# Patient Record
Sex: FEMALE | Race: BLACK OR AFRICAN AMERICAN | NOT HISPANIC OR LATINO | Employment: OTHER | URBAN - METROPOLITAN AREA
[De-identification: names, ages, dates, MRNs, and addresses within clinical notes are randomized per-mention and may not be internally consistent; named-entity substitution may affect disease eponyms.]

---

## 2023-02-22 LAB
ALANINE AMINOTRANSFERASE (SGPT) (U/L) IN SER/PLAS: 15 U/L (ref 7–45)
ALBUMIN (G/DL) IN SER/PLAS: 3.8 G/DL (ref 3.4–5)
ALKALINE PHOSPHATASE (U/L) IN SER/PLAS: 141 U/L (ref 33–136)
ANION GAP IN SER/PLAS: 21 MMOL/L (ref 10–20)
ASPARTATE AMINOTRANSFERASE (SGOT) (U/L) IN SER/PLAS: 16 U/L (ref 9–39)
BILIRUBIN TOTAL (MG/DL) IN SER/PLAS: 0.4 MG/DL (ref 0–1.2)
CALCIUM (MG/DL) IN SER/PLAS: 9.4 MG/DL (ref 8.6–10.6)
CARBON DIOXIDE, TOTAL (MMOL/L) IN SER/PLAS: 19 MMOL/L (ref 21–32)
CHLORIDE (MMOL/L) IN SER/PLAS: 102 MMOL/L (ref 98–107)
CHOLESTEROL (MG/DL) IN SER/PLAS: 189 MG/DL (ref 0–199)
CHOLESTEROL IN HDL (MG/DL) IN SER/PLAS: 37.5 MG/DL
CHOLESTEROL/HDL RATIO: 5
CREATININE (MG/DL) IN SER/PLAS: 4.22 MG/DL (ref 0.5–1.05)
ERYTHROCYTE DISTRIBUTION WIDTH (RATIO) BY AUTOMATED COUNT: 16.1 % (ref 11.5–14.5)
ERYTHROCYTE MEAN CORPUSCULAR HEMOGLOBIN CONCENTRATION (G/DL) BY AUTOMATED: 30.8 G/DL (ref 32–36)
ERYTHROCYTE MEAN CORPUSCULAR VOLUME (FL) BY AUTOMATED COUNT: 90 FL (ref 80–100)
ERYTHROCYTES (10*6/UL) IN BLOOD BY AUTOMATED COUNT: 2.95 X10E12/L (ref 4–5.2)
ESTIMATED AVERAGE GLUCOSE FOR HBA1C: 160 MG/DL
GFR FEMALE: 10 ML/MIN/1.73M2
GLUCOSE (MG/DL) IN SER/PLAS: 147 MG/DL (ref 74–99)
HEMATOCRIT (%) IN BLOOD BY AUTOMATED COUNT: 26.6 % (ref 36–46)
HEMOGLOBIN (G/DL) IN BLOOD: 8.2 G/DL (ref 12–16)
HEMOGLOBIN A1C/HEMOGLOBIN TOTAL IN BLOOD: 7.2 %
LDL: 114 MG/DL (ref 0–99)
LEUKOCYTES (10*3/UL) IN BLOOD BY AUTOMATED COUNT: 7.3 X10E9/L (ref 4.4–11.3)
NRBC (PER 100 WBCS) BY AUTOMATED COUNT: 0 /100 WBC (ref 0–0)
PLATELETS (10*3/UL) IN BLOOD AUTOMATED COUNT: 208 X10E9/L (ref 150–450)
POTASSIUM (MMOL/L) IN SER/PLAS: 3.9 MMOL/L (ref 3.5–5.3)
PROTEIN TOTAL: 8.3 G/DL (ref 6.4–8.2)
SODIUM (MMOL/L) IN SER/PLAS: 138 MMOL/L (ref 136–145)
THYROTROPIN (MIU/L) IN SER/PLAS BY DETECTION LIMIT <= 0.05 MIU/L: 1.9 MIU/L (ref 0.44–3.98)
TRIGLYCERIDE (MG/DL) IN SER/PLAS: 187 MG/DL (ref 0–149)
UREA NITROGEN (MG/DL) IN SER/PLAS: 67 MG/DL (ref 6–23)
VLDL: 37 MG/DL (ref 0–40)

## 2023-05-16 LAB
ALBUMIN (G/DL) IN SER/PLAS: 3.4 G/DL (ref 3.4–5)
ANION GAP IN SER/PLAS: 17 MMOL/L (ref 10–20)
CALCIUM (MG/DL) IN SER/PLAS: 9.7 MG/DL (ref 8.6–10.6)
CARBON DIOXIDE, TOTAL (MMOL/L) IN SER/PLAS: 23 MMOL/L (ref 21–32)
CHLORIDE (MMOL/L) IN SER/PLAS: 104 MMOL/L (ref 98–107)
CREATININE (MG/DL) IN SER/PLAS: 4.53 MG/DL (ref 0.5–1.05)
ERYTHROCYTE DISTRIBUTION WIDTH (RATIO) BY AUTOMATED COUNT: 16.4 % (ref 11.5–14.5)
ERYTHROCYTE MEAN CORPUSCULAR HEMOGLOBIN CONCENTRATION (G/DL) BY AUTOMATED: 29.7 G/DL (ref 32–36)
ERYTHROCYTE MEAN CORPUSCULAR VOLUME (FL) BY AUTOMATED COUNT: 91 FL (ref 80–100)
ERYTHROCYTES (10*6/UL) IN BLOOD BY AUTOMATED COUNT: 2.85 X10E12/L (ref 4–5.2)
FERRITIN (UG/LL) IN SER/PLAS: 125 UG/L (ref 8–150)
GFR FEMALE: 10 ML/MIN/1.73M2
GLUCOSE (MG/DL) IN SER/PLAS: 116 MG/DL (ref 74–99)
HEMATOCRIT (%) IN BLOOD BY AUTOMATED COUNT: 25.9 % (ref 36–46)
HEMOGLOBIN (G/DL) IN BLOOD: 7.7 G/DL (ref 12–16)
IRON (UG/DL) IN SER/PLAS: 38 UG/DL (ref 35–150)
IRON BINDING CAPACITY (UG/DL) IN SER/PLAS: 304 UG/DL (ref 240–445)
IRON SATURATION (%) IN SER/PLAS: 13 % (ref 25–45)
LEUKOCYTES (10*3/UL) IN BLOOD BY AUTOMATED COUNT: 5.4 X10E9/L (ref 4.4–11.3)
NRBC (PER 100 WBCS) BY AUTOMATED COUNT: 0 /100 WBC (ref 0–0)
PHOSPHATE (MG/DL) IN SER/PLAS: 4 MG/DL (ref 2.5–4.9)
PLATELETS (10*3/UL) IN BLOOD AUTOMATED COUNT: 209 X10E9/L (ref 150–450)
POTASSIUM (MMOL/L) IN SER/PLAS: 4.5 MMOL/L (ref 3.5–5.3)
SODIUM (MMOL/L) IN SER/PLAS: 139 MMOL/L (ref 136–145)
UREA NITROGEN (MG/DL) IN SER/PLAS: 73 MG/DL (ref 6–23)

## 2023-07-07 LAB
ALBUMIN (G/DL) IN SER/PLAS: 3.4 G/DL (ref 3.4–5)
ANION GAP IN SER/PLAS: 18 MMOL/L (ref 10–20)
CALCIDIOL (25 OH VITAMIN D3) (NG/ML) IN SER/PLAS: 82 NG/ML
CALCIUM (MG/DL) IN SER/PLAS: 9.2 MG/DL (ref 8.6–10.6)
CARBON DIOXIDE, TOTAL (MMOL/L) IN SER/PLAS: 20 MMOL/L (ref 21–32)
CHLORIDE (MMOL/L) IN SER/PLAS: 101 MMOL/L (ref 98–107)
CREATININE (MG/DL) IN SER/PLAS: 4.61 MG/DL (ref 0.5–1.05)
ERYTHROCYTE DISTRIBUTION WIDTH (RATIO) BY AUTOMATED COUNT: 16.7 % (ref 11.5–14.5)
ERYTHROCYTE MEAN CORPUSCULAR HEMOGLOBIN CONCENTRATION (G/DL) BY AUTOMATED: 30.3 G/DL (ref 32–36)
ERYTHROCYTE MEAN CORPUSCULAR VOLUME (FL) BY AUTOMATED COUNT: 90 FL (ref 80–100)
ERYTHROCYTES (10*6/UL) IN BLOOD BY AUTOMATED COUNT: 2.64 X10E12/L (ref 4–5.2)
GFR FEMALE: 9 ML/MIN/1.73M2
GLUCOSE (MG/DL) IN SER/PLAS: 340 MG/DL (ref 74–99)
HEMATOCRIT (%) IN BLOOD BY AUTOMATED COUNT: 23.8 % (ref 36–46)
HEMOGLOBIN (G/DL) IN BLOOD: 7.2 G/DL (ref 12–16)
LEUKOCYTES (10*3/UL) IN BLOOD BY AUTOMATED COUNT: 5.8 X10E9/L (ref 4.4–11.3)
NRBC (PER 100 WBCS) BY AUTOMATED COUNT: 0 /100 WBC (ref 0–0)
PARATHYRIN INTACT (PG/ML) IN SER/PLAS: 149.5 PG/ML (ref 18.5–88)
PHOSPHATE (MG/DL) IN SER/PLAS: 4.6 MG/DL (ref 2.5–4.9)
PLATELETS (10*3/UL) IN BLOOD AUTOMATED COUNT: 157 X10E9/L (ref 150–450)
POTASSIUM (MMOL/L) IN SER/PLAS: 4.2 MMOL/L (ref 3.5–5.3)
SODIUM (MMOL/L) IN SER/PLAS: 135 MMOL/L (ref 136–145)
URATE (MG/DL) IN SER/PLAS: 6.6 MG/DL (ref 2.3–6.7)
UREA NITROGEN (MG/DL) IN SER/PLAS: 70 MG/DL (ref 6–23)

## 2023-09-15 LAB
ALBUMIN (G/DL) IN SER/PLAS: 3.4 G/DL (ref 3.4–5)
ANION GAP IN SER/PLAS: 18 MMOL/L (ref 10–20)
CALCIUM (MG/DL) IN SER/PLAS: 8.8 MG/DL (ref 8.6–10.6)
CARBON DIOXIDE, TOTAL (MMOL/L) IN SER/PLAS: 19 MMOL/L (ref 21–32)
CHLORIDE (MMOL/L) IN SER/PLAS: 104 MMOL/L (ref 98–107)
CREATININE (MG/DL) IN SER/PLAS: 6.06 MG/DL (ref 0.5–1.05)
ERYTHROCYTE DISTRIBUTION WIDTH (RATIO) BY AUTOMATED COUNT: 16 % (ref 11.5–14.5)
ERYTHROCYTE MEAN CORPUSCULAR HEMOGLOBIN CONCENTRATION (G/DL) BY AUTOMATED: 30 G/DL (ref 32–36)
ERYTHROCYTE MEAN CORPUSCULAR VOLUME (FL) BY AUTOMATED COUNT: 89 FL (ref 80–100)
ERYTHROCYTES (10*6/UL) IN BLOOD BY AUTOMATED COUNT: 2.91 X10E12/L (ref 4–5.2)
GFR FEMALE: 7 ML/MIN/1.73M2
GLUCOSE (MG/DL) IN SER/PLAS: 162 MG/DL (ref 74–99)
HEMATOCRIT (%) IN BLOOD BY AUTOMATED COUNT: 26 % (ref 36–46)
HEMOGLOBIN (G/DL) IN BLOOD: 7.8 G/DL (ref 12–16)
LEUKOCYTES (10*3/UL) IN BLOOD BY AUTOMATED COUNT: 6.4 X10E9/L (ref 4.4–11.3)
NRBC (PER 100 WBCS) BY AUTOMATED COUNT: 0 /100 WBC (ref 0–0)
PHOSPHATE (MG/DL) IN SER/PLAS: 5.2 MG/DL (ref 2.5–4.9)
PLATELETS (10*3/UL) IN BLOOD AUTOMATED COUNT: 208 X10E9/L (ref 150–450)
POTASSIUM (MMOL/L) IN SER/PLAS: 4.2 MMOL/L (ref 3.5–5.3)
SODIUM (MMOL/L) IN SER/PLAS: 137 MMOL/L (ref 136–145)
UREA NITROGEN (MG/DL) IN SER/PLAS: 74 MG/DL (ref 6–23)

## 2023-10-08 DIAGNOSIS — D64.9 ANEMIA, UNSPECIFIED TYPE: Primary | ICD-10-CM

## 2023-10-08 RX ORDER — FAMOTIDINE 10 MG/ML
20 INJECTION INTRAVENOUS ONCE AS NEEDED
Status: CANCELLED | OUTPATIENT
Start: 2023-10-12

## 2023-10-08 RX ORDER — ALBUTEROL SULFATE 0.83 MG/ML
3 SOLUTION RESPIRATORY (INHALATION) AS NEEDED
Status: CANCELLED | OUTPATIENT
Start: 2023-10-12

## 2023-10-08 RX ORDER — METHYLPREDNISOLONE SODIUM SUCCINATE 40 MG/ML
40 INJECTION INTRAMUSCULAR; INTRAVENOUS AS NEEDED
Status: CANCELLED | OUTPATIENT
Start: 2023-10-12

## 2023-10-08 RX ORDER — EPINEPHRINE 0.3 MG/.3ML
0.3 INJECTION SUBCUTANEOUS EVERY 5 MIN PRN
Status: CANCELLED | OUTPATIENT
Start: 2023-10-12

## 2023-10-08 RX ORDER — DIPHENHYDRAMINE HYDROCHLORIDE 50 MG/ML
50 INJECTION INTRAMUSCULAR; INTRAVENOUS AS NEEDED
Status: CANCELLED | OUTPATIENT
Start: 2023-10-12

## 2023-10-12 ENCOUNTER — INFUSION (OUTPATIENT)
Dept: INFUSION THERAPY | Facility: CLINIC | Age: 78
End: 2023-10-12
Payer: MEDICARE

## 2023-10-12 VITALS
WEIGHT: 139.33 LBS | TEMPERATURE: 98.3 F | BODY MASS INDEX: 25.48 KG/M2 | DIASTOLIC BLOOD PRESSURE: 74 MMHG | OXYGEN SATURATION: 98 % | RESPIRATION RATE: 18 BRPM | HEART RATE: 66 BPM | SYSTOLIC BLOOD PRESSURE: 161 MMHG

## 2023-10-12 DIAGNOSIS — D64.9 ANEMIA, UNSPECIFIED TYPE: ICD-10-CM

## 2023-10-12 PROBLEM — M48.02 STENOSIS, CERVICAL SPINE: Status: ACTIVE | Noted: 2023-10-12

## 2023-10-12 PROBLEM — K11.20 PAROTITIS: Status: ACTIVE | Noted: 2023-10-12

## 2023-10-12 PROBLEM — D10.9: Status: ACTIVE | Noted: 2023-10-12

## 2023-10-12 PROBLEM — H91.93 BILATERAL HEARING LOSS: Status: ACTIVE | Noted: 2023-10-12

## 2023-10-12 PROBLEM — R05.3 CHRONIC COUGH: Status: ACTIVE | Noted: 2023-10-12

## 2023-10-12 PROCEDURE — 96372 THER/PROPH/DIAG INJ SC/IM: CPT | Performed by: NURSE PRACTITIONER

## 2023-10-12 RX ORDER — ALBUTEROL SULFATE 0.83 MG/ML
3 SOLUTION RESPIRATORY (INHALATION) AS NEEDED
Status: CANCELLED | OUTPATIENT
Start: 2023-10-19

## 2023-10-12 RX ORDER — PRAVASTATIN SODIUM 20 MG/1
20 TABLET ORAL
COMMUNITY

## 2023-10-12 RX ORDER — MUPIROCIN 20 MG/G
OINTMENT TOPICAL
COMMUNITY
Start: 2019-05-29 | End: 2024-03-07 | Stop reason: ALTCHOICE

## 2023-10-12 RX ORDER — DIPHENHYDRAMINE HYDROCHLORIDE 50 MG/ML
50 INJECTION INTRAMUSCULAR; INTRAVENOUS AS NEEDED
Status: CANCELLED | OUTPATIENT
Start: 2023-10-19

## 2023-10-12 RX ORDER — SODIUM BICARBONATE 650 MG/1
650 TABLET ORAL 2 TIMES DAILY
COMMUNITY
Start: 2022-11-02

## 2023-10-12 RX ORDER — CLONIDINE HYDROCHLORIDE 0.2 MG/1
1 TABLET ORAL DAILY
COMMUNITY
Start: 2021-01-29 | End: 2024-02-29 | Stop reason: SDUPTHER

## 2023-10-12 RX ORDER — GLIPIZIDE 5 MG/1
5 TABLET ORAL
COMMUNITY

## 2023-10-12 RX ORDER — ERGOCALCIFEROL 1.25 MG/1
1.25 CAPSULE ORAL
COMMUNITY
Start: 2014-01-09 | End: 2024-06-07 | Stop reason: WASHOUT

## 2023-10-12 RX ORDER — ALLOPURINOL 100 MG/1
1 TABLET ORAL DAILY
COMMUNITY
Start: 2022-11-02

## 2023-10-12 RX ORDER — LOSARTAN POTASSIUM 100 MG/1
100 TABLET ORAL
COMMUNITY

## 2023-10-12 RX ORDER — OMEPRAZOLE 20 MG/1
20 CAPSULE, DELAYED RELEASE ORAL
COMMUNITY

## 2023-10-12 RX ORDER — OXYCODONE AND ACETAMINOPHEN 5; 325 MG/1; MG/1
1 TABLET ORAL
COMMUNITY
Start: 2014-01-09 | End: 2024-03-07 | Stop reason: ALTCHOICE

## 2023-10-12 RX ORDER — METOPROLOL TARTRATE 25 MG/1
25 TABLET, FILM COATED ORAL
COMMUNITY
Start: 2014-01-09

## 2023-10-12 RX ORDER — METFORMIN HYDROCHLORIDE 1000 MG/1
1000 TABLET ORAL 2 TIMES DAILY
COMMUNITY
End: 2024-02-29 | Stop reason: WASHOUT

## 2023-10-12 RX ORDER — CYCLOBENZAPRINE HCL 10 MG
10 TABLET ORAL AS NEEDED
COMMUNITY
Start: 2014-01-09

## 2023-10-12 RX ORDER — AMLODIPINE BESYLATE 10 MG/1
10 TABLET ORAL
COMMUNITY

## 2023-10-12 RX ORDER — FAMOTIDINE 10 MG/ML
20 INJECTION INTRAVENOUS ONCE AS NEEDED
Status: CANCELLED | OUTPATIENT
Start: 2023-10-19

## 2023-10-12 RX ORDER — HYDRALAZINE HYDROCHLORIDE 100 MG/1
100 TABLET, FILM COATED ORAL 3 TIMES DAILY
COMMUNITY
Start: 2022-10-05

## 2023-10-12 RX ORDER — EPINEPHRINE 0.3 MG/.3ML
0.3 INJECTION SUBCUTANEOUS EVERY 5 MIN PRN
Status: CANCELLED | OUTPATIENT
Start: 2023-10-19

## 2023-10-12 RX ORDER — LIDOCAINE 50 MG/G
1 PATCH TOPICAL AS NEEDED
COMMUNITY
Start: 2022-11-02

## 2023-10-12 RX ORDER — ATORVASTATIN CALCIUM 10 MG/1
1 TABLET, FILM COATED ORAL DAILY
COMMUNITY
Start: 2022-11-02

## 2023-10-12 RX ORDER — FERROUS SULFATE 325(65) MG
1 TABLET ORAL DAILY
COMMUNITY
Start: 2022-10-05

## 2023-10-12 RX ORDER — NAPROXEN SODIUM 220 MG/1
1 TABLET, FILM COATED ORAL DAILY
COMMUNITY
Start: 2021-01-28

## 2023-10-12 ASSESSMENT — ENCOUNTER SYMPTOMS
PSYCHIATRIC NEGATIVE: 1
CARDIOVASCULAR NEGATIVE: 1
GASTROINTESTINAL NEGATIVE: 1
CONSTITUTIONAL NEGATIVE: 1
HEMATOLOGIC/LYMPHATIC NEGATIVE: 1
MUSCULOSKELETAL NEGATIVE: 1
EYES NEGATIVE: 1
RESPIRATORY NEGATIVE: 1
NEUROLOGICAL NEGATIVE: 1

## 2023-10-12 ASSESSMENT — PAIN SCALES - GENERAL: PAINLEVEL: 0-NO PAIN

## 2023-10-12 NOTE — PATIENT INSTRUCTIONS
Today you received: Procrit [id number] subcutaneous injection right upper arm    Hold Hgb >11   9- Hgb 7.8   Hgb every 4 weeks    Next appointment next week        For:   1. Anemia, unspecified type          Please read the  Medication Guide that was given to you and reviewed during todays visit.     (Tell all doctors including dentists that you are taking this medication)     Go to the emergency room or call 911 if:  -You have signs of allergic reaction:   o         Rash, hives, itching.   o         Swollen, blistered, peeling skin.   o         Swelling of face, lips, mouth, tongue or throat.   o         Tightness of chest, trouble breathing, swallowing or talking      Call your doctor:     - If injection site gets red, warm, swollen, itchy or leaks fluid or pus.     (Leave band aid on your injection site for at least 2 hours and keep area clean and dry  - If you get sick or have symptoms of infection or are not feeling well for any reason.    (Wash your hands often, stay away from people who are sick)  - If you have side effects from your medication that do not go away or are bothersome.     (Refer to the teaching your nurse gave you for side effects to call your doctor about)     Common side effects may include:  stuffy nose, headache, feeling tired, muscle aches, upset stomach  - Before receiving any vaccines, Call the [facility] at   if:  - You get sick, are on antibiotics, have had a recent vaccine, have surgery or dental work and your doctor wants your visit rescheduled.  - You need to cancel and reschedule your visit for any reason. Call at least 2 days before your visit if you need to cancel.   - Your insurance changes before your next visit.    (We will need to get approval from your new insurance. This can take up to two weeks.)     The [facility] is opened Monday thru Friday. We are closed on weekends and holidays.     Voice mail will take your call if the center  is closed. If you leave a message please allow 24 hours for a call back during weekdays. If you leave a message on a weekend/holiday, we will call you back the next business day.

## 2023-10-12 NOTE — PROGRESS NOTES
[facility]   infusion Clinic Note   Date: 2023   Name: Vida May  : 1945   MRN: [id number]         Reason for Visit: Injections (Weekly Procrit [id number] subcutaneous injection)       Visit Type: INJECTION     Ordered By:  Tucker Pepper DO   Accompanied by:Relative      Diagnosis: Anemia, unspecified type      Allergies:   Allergies as of 10/12/2023    (No Known Allergies)        Current Medications has a current medication list which includes the following prescription(s): allopurinol, amlodipine, aspirin, atorvastatin, clonidine, cyclobenzaprine, epoetin abhinav, ergocalciferol, ferrous sulfate, glipizide, hydralazine, lidocaine, losartan, metformin, metoprolol tartrate, mupirocin, omeprazole, oxycodone-acetaminophen, pravastatin, and sodium bicarbonate.          Vitals:  Vitals:    10/12/23 1307   BP: 161/74   Patient Position: Sitting   BP Cuff Size: Adult   Pulse: 66   Resp: 18   Temp: 36.8 °C (98.3 °F)   TempSrc: Temporal   SpO2: 98%   Weight: 63.2 kg (139 lb 5.3 oz)          Infusion Pre-procedure Checklist:   Allergies reviewed: yes   Medications reviewed: yes     Previous reaction to current treatment: No      Assess patient for the concerns below. Document provider notification as appropriate.  - Active or recent infection with/without current antibiotic use: no  - Recent or planned invasive dental work: no  - Recent or planned surgeries: no  - Recently received or plans to receive vaccinations: no  - Has treatment related toxicities: no  - Is pregnant:  n/a    - Does the patient meet criteria to treat? Yes    Provider notified? Not applicable      Pain:  0/10   Is the pain different from normal: n/a   Is the pain tolerable: n/a   Is your Doctor aware: n/a       Labs:  9- Hgb 7.8      Fall Risk Screening: Janette Fall Risk  History of Falling, Immediate or Within 3 Months: No  Ambulatory Aid: Walks without aid/bedrest/nurse assist  Intravenous  Therapy/Heparin Lock: No  Gait/Transferring: Normal/bedrest/immobile  Mental Status: Oriented to own ability       Review of Systems   Constitutional: Negative.    HENT:  Negative.     Eyes: Negative.         Wears glasses   Respiratory: Negative.          Can become SOB on exertion   Cardiovascular: Negative.    Gastrointestinal: Negative.    Genitourinary: Negative.     Musculoskeletal: Negative.    Skin: Negative.    Neurological: Negative.    Hematological: Negative.    Psychiatric/Behavioral: Negative.           Infusion Readiness:   Assessment Concerns Related to Infusion: No (injection)  Provider notified: n/a      Document Below Only If Indicated:   New Patient Education:         Drug Specific Questions:  Epoetin Celestino (EPOGEN. PROCRIT, RETACRIT)  IS SBP > 160 OR 30 MM/HG GREATER THAN LAST RECORDED SBP? No  (PT SHOULD UNDERGO REGULAR BP MONITORING)    PT INFORMED OF INCREASED RISKS OF MORTALITY, SERIOUS CARDIOVASCULAR REACTIONS, THEROMBOEMBOLIC REACTIONS, STROKE AND TUMOR PROGRESSION?   Yes    PT INFORMED TO CONTACT PROVIDER FOR NEW ONSET NEUROLOGIC S/S OR CHANGES IN SEIZURE FREQUENCY? Yes    PT AWARE OF NEED TO HAVE REGULAR LAB TESTS TO MONITOR HEMOGLOBIN? Yes     Weight Based Drug Calculations:  Flat dose          Initiated By: Camille Hansen LPN   Time: 1:25 PM     We administered epoetin celestino.

## 2023-10-19 ENCOUNTER — INFUSION (OUTPATIENT)
Dept: INFUSION THERAPY | Facility: CLINIC | Age: 78
End: 2023-10-19
Payer: MEDICARE

## 2023-10-19 VITALS
TEMPERATURE: 97.7 F | HEART RATE: 64 BPM | DIASTOLIC BLOOD PRESSURE: 61 MMHG | SYSTOLIC BLOOD PRESSURE: 159 MMHG | OXYGEN SATURATION: 96 % | RESPIRATION RATE: 16 BRPM

## 2023-10-19 DIAGNOSIS — D64.9 ANEMIA, UNSPECIFIED TYPE: ICD-10-CM

## 2023-10-19 LAB — POC HEMOGLOBIN: 8.1 G/DL (ref 12–16)

## 2023-10-19 PROCEDURE — 96372 THER/PROPH/DIAG INJ SC/IM: CPT | Performed by: NURSE PRACTITIONER

## 2023-10-19 RX ORDER — ALBUTEROL SULFATE 0.83 MG/ML
3 SOLUTION RESPIRATORY (INHALATION) AS NEEDED
Status: CANCELLED | OUTPATIENT
Start: 2023-10-26

## 2023-10-19 RX ORDER — EPINEPHRINE 0.3 MG/.3ML
0.3 INJECTION SUBCUTANEOUS EVERY 5 MIN PRN
Status: CANCELLED | OUTPATIENT
Start: 2023-10-26

## 2023-10-19 RX ORDER — FAMOTIDINE 10 MG/ML
20 INJECTION INTRAVENOUS ONCE AS NEEDED
Status: CANCELLED | OUTPATIENT
Start: 2023-10-26

## 2023-10-19 RX ORDER — DIPHENHYDRAMINE HYDROCHLORIDE 50 MG/ML
50 INJECTION INTRAMUSCULAR; INTRAVENOUS AS NEEDED
Status: CANCELLED | OUTPATIENT
Start: 2023-10-26

## 2023-10-19 ASSESSMENT — ENCOUNTER SYMPTOMS
FREQUENCY: 0
DIARRHEA: 0
APPETITE CHANGE: 1
ABDOMINAL PAIN: 0
NUMBNESS: 1
ENDOCRINE NEGATIVE: 1
CONSTIPATION: 0
SHORTNESS OF BREATH: 1
SORE THROAT: 0
PALPITATIONS: 0
BACK PAIN: 1
HEMATOLOGIC/LYMPHATIC NEGATIVE: 1
DEPRESSION: 0
VOMITING: 0
NAUSEA: 1
PSYCHIATRIC NEGATIVE: 1
SLEEP DISTURBANCE: 0
EYES NEGATIVE: 1
CARDIOVASCULAR NEGATIVE: 1
NERVOUS/ANXIOUS: 0
TROUBLE SWALLOWING: 0

## 2023-10-19 NOTE — PROGRESS NOTES
[facility]   infusion Clinic Note   Date: 2023   Name: Vida May  : 1945   MRN: [id number]         Reason for Visit: Injections (PROCRIT 10,000 UNITS )       Visit Type: INJECTION     Ordered By: DR. BLAKE LOPEZ   Accompanied by:Relative      Diagnosis: Anemia, unspecified type      Allergies:   Allergies as of 10/19/2023    (No Known Allergies)        Current Medications has a current medication list which includes the following prescription(s): allopurinol, amlodipine, aspirin, atorvastatin, clonidine, cyclobenzaprine, epoetin abhinav, ergocalciferol, ferrous sulfate, glipizide, hydralazine, lidocaine, losartan, metformin, metoprolol tartrate, mupirocin, omeprazole, oxycodone-acetaminophen, pravastatin, and sodium bicarbonate.          Vitals:  Vitals:    10/19/23 1557 10/19/23 1615   BP: 174/64 159/61   Pulse: 76 64   Resp: 16 16   Temp: 36.6 °C (97.8 °F) 36.5 °C (97.7 °F)   SpO2: 94% 96%          Infusion Pre-procedure Checklist:   Allergies reviewed: yes   Medications reviewed: yes     Previous reaction to current treatment: No      Assess patient for the concerns below. Document provider notification as appropriate.  - Active or recent infection with/without current antibiotic use: no  - Recent or planned invasive dental work: no  - Recent or planned surgeries: no  - Recently received or plans to receive vaccinations: no  - Has treatment related toxicities: no  - Is pregnant:  n/a    - Does the patient meet criteria to treat? Yes    Provider notified? No      Pain: 0    Is the pain different from normal: n/a   Is the pain tolerable: n/a   Is your Doctor aware: n/a       Labs: Pending HEMOCUE DRAWN TODAY HGB; 8.1 ON 10/19/23      Fall Risk Screening: Janette Fall Risk  History of Falling, Immediate or Within 3 Months: No  Secondary Diagnosis: No  Ambulatory Aid: Walks without aid/bedrest/nurse assist  Intravenous Therapy/Heparin Lock: No  Gait/Transferring:  Normal/bedrest/immobile  Mental Status: Oriented to own ability  Savage Fall Risk Score: 0       Review of Systems   Constitutional:  Positive for appetite change.        PT ADMITS TO LOW APPETITE AND MILD FATIGUE.    HENT:  Negative.  Negative for hearing loss, sore throat, tinnitus and trouble swallowing.    Eyes: Negative.    Respiratory:  Positive for shortness of breath.         SOB WITH EXERTION   Cardiovascular: Negative.  Negative for chest pain and palpitations.   Gastrointestinal:  Positive for nausea. Negative for abdominal pain, constipation, diarrhea and vomiting.   Endocrine: Negative.    Genitourinary: Negative.  Negative for frequency.    Musculoskeletal:  Positive for back pain.        PT ADMITS TO SOME BACK PAIN.   Skin: Negative.    Neurological:  Positive for numbness.        N/T IN HANDS AT TIMES.    Hematological: Negative.    Psychiatric/Behavioral: Negative.  Negative for depression and sleep disturbance. The patient is not nervous/anxious.          Infusion Readiness:   Assessment Concerns Related to Infusion: No  Provider notified: no      Document Below Only If Indicated:   New Patient Education:  N/A (returning patient for continuation of therapy. Ongoing education provided as needed.)       Drug Specific Questions:  Epoetin Abhinav (EPOGEN. PROCRIT, RETACRIT)  IS SBP > 160 OR 30 MM/HG GREATER THAN LAST RECORDED SBP? NO  (PT SHOULD UNDERGO REGULAR BP MONITORING)    PT INFORMED OF INCREASED RISKS OF MORTALITY, SERIOUS CARDIOVASCULAR REACTIONS, THEROMBOEMBOLIC REACTIONS, STROKE AND TUMOR PROGRESSION? YES    PT INFORMED TO CONTACT PROVIDER FOR NEW ONSET NEUROLOGIC S/S OR CHANGES IN SEIZURE FREQUENCY? YES    PT AWARE OF NEED TO HAVE REGULAR LAB TESTS TO MONITOR HEMOGLOBIN? YES     Weight Based Drug Calculations:  WEIGHT BASED DRUGS: NOT APPLICABLE           Initiated By: Aliya Correa RN   Time: 4:33 PM     We administered epoetin abhinav.

## 2023-10-19 NOTE — PATIENT INSTRUCTIONS
Today you received: PROCRIT 10,000 UNITS   TODAYS HEMOGLOBIN: 8.1     NEXT APPT: NEXT WEEK     For:   1. Anemia, unspecified type          Please read the  Medication Guide that was given to you and reviewed during todays visit.     (Tell all doctors including dentists that you are taking this medication)     Go to the emergency room or call 911 if:  -You have signs of allergic reaction:   o         Rash, hives, itching.   o         Swollen, blistered, peeling skin.   o         Swelling of face, lips, mouth, tongue or throat.   o         Tightness of chest, trouble breathing, swallowing or talking      Call your doctor:     - If IV / injection site gets red, warm, swollen, itchy or leaks fluid or pus.     (Leave dressing on your IV site for at least 2 hours and keep area clean and dry  - If you get sick or have symptoms of infection or are not feeling well for any reason.    (Wash your hands often, stay away from people who are sick)  - If you have side effects from your medication that do not go away or are bothersome.     (Refer to the teaching your nurse gave you for side effects to call your doctor about)     Common side effects may include:  stuffy nose, headache, feeling tired, muscle aches, upset stomach  - Before receiving any vaccines, Call the [facility] at   if:  - You get sick, are on antibiotics, have had a recent vaccine, have surgery or dental work and your doctor wants your visit rescheduled.  - You need to cancel and reschedule your visit for any reason. Call at least 2 days before your visit if you need to cancel.   - Your insurance changes before your next visit.    (We will need to get approval from your new insurance. This can take up to two weeks.)     [facility] is opened Monday thru Friday. We are closed on weekends and holidays.     Voice mail will take your call if the center is closed. If you leave a message please allow 24 hours for a call back during  weekdays. If you leave a message on a weekend/holiday, we will call you back the next business day.        Note authored and patient cared for by: AVERY Parham RN   Note/Encounter reviewed by: KENA Romero FNP-C. This provider on site at time of patient injection. Infusion staff to notify this provider of any questions, concerns, abnormals or issues during visit. No issues reported. Pt. tolerated injection without difficulty. Pt. not independently evaluated by this provider during today's encounter.

## 2023-11-02 ENCOUNTER — APPOINTMENT (OUTPATIENT)
Dept: INFUSION THERAPY | Facility: CLINIC | Age: 78
End: 2023-11-02
Payer: MEDICARE

## 2023-11-09 ENCOUNTER — APPOINTMENT (OUTPATIENT)
Dept: INFUSION THERAPY | Facility: CLINIC | Age: 78
End: 2023-11-09
Payer: MEDICARE

## 2023-11-15 ENCOUNTER — APPOINTMENT (OUTPATIENT)
Dept: RADIOLOGY | Facility: HOSPITAL | Age: 78
End: 2023-11-15
Payer: MEDICARE

## 2023-11-15 ENCOUNTER — DOCUMENTATION (OUTPATIENT)
Dept: NEPHROLOGY | Facility: HOSPITAL | Age: 78
End: 2023-11-15

## 2023-11-15 ENCOUNTER — HOSPITAL ENCOUNTER (EMERGENCY)
Facility: HOSPITAL | Age: 78
Discharge: HOME | End: 2023-11-15
Attending: EMERGENCY MEDICINE
Payer: MEDICARE

## 2023-11-15 VITALS
RESPIRATION RATE: 16 BRPM | WEIGHT: 139 LBS | OXYGEN SATURATION: 100 % | DIASTOLIC BLOOD PRESSURE: 71 MMHG | HEART RATE: 83 BPM | BODY MASS INDEX: 25.58 KG/M2 | TEMPERATURE: 98.8 F | HEIGHT: 62 IN | SYSTOLIC BLOOD PRESSURE: 152 MMHG

## 2023-11-15 DIAGNOSIS — M25.561 ACUTE PAIN OF RIGHT KNEE: Primary | ICD-10-CM

## 2023-11-15 LAB
ALBUMIN SERPL BCP-MCNC: 3.3 G/DL (ref 3.4–5)
ALP SERPL-CCNC: 130 U/L (ref 33–136)
ALT SERPL W P-5'-P-CCNC: 24 U/L (ref 7–45)
ANION GAP SERPL CALC-SCNC: 18 MMOL/L (ref 10–20)
APTT PPP: 36 SECONDS (ref 27–38)
AST SERPL W P-5'-P-CCNC: 25 U/L (ref 9–39)
BASOPHILS # BLD AUTO: 0.03 X10*3/UL (ref 0–0.1)
BASOPHILS NFR BLD AUTO: 0.7 %
BILIRUB SERPL-MCNC: 0.4 MG/DL (ref 0–1.2)
BUN SERPL-MCNC: 54 MG/DL (ref 6–23)
CALCIUM SERPL-MCNC: 9 MG/DL (ref 8.6–10.3)
CHLORIDE SERPL-SCNC: 103 MMOL/L (ref 98–107)
CO2 SERPL-SCNC: 17 MMOL/L (ref 21–32)
CREAT SERPL-MCNC: 4.05 MG/DL (ref 0.5–1.05)
CRP SERPL-MCNC: 8.09 MG/DL
EOSINOPHIL # BLD AUTO: 0.08 X10*3/UL (ref 0–0.4)
EOSINOPHIL NFR BLD AUTO: 1.8 %
ERYTHROCYTE [DISTWIDTH] IN BLOOD BY AUTOMATED COUNT: 17.6 % (ref 11.5–14.5)
ERYTHROCYTE [SEDIMENTATION RATE] IN BLOOD BY WESTERGREN METHOD: >130 MM/H (ref 0–30)
GFR SERPL CREATININE-BSD FRML MDRD: 11 ML/MIN/1.73M*2
GLUCOSE SERPL-MCNC: 157 MG/DL (ref 74–99)
HCT VFR BLD AUTO: 32.9 % (ref 36–46)
HGB BLD-MCNC: 10 G/DL (ref 12–16)
IMM GRANULOCYTES # BLD AUTO: 0.02 X10*3/UL (ref 0–0.5)
IMM GRANULOCYTES NFR BLD AUTO: 0.4 % (ref 0–0.9)
INR PPP: 1 (ref 0.9–1.1)
LYMPHOCYTES # BLD AUTO: 0.74 X10*3/UL (ref 0.8–3)
LYMPHOCYTES NFR BLD AUTO: 16.6 %
MCH RBC QN AUTO: 26.7 PG (ref 26–34)
MCHC RBC AUTO-ENTMCNC: 30.4 G/DL (ref 32–36)
MCV RBC AUTO: 88 FL (ref 80–100)
MONOCYTES # BLD AUTO: 0.39 X10*3/UL (ref 0.05–0.8)
MONOCYTES NFR BLD AUTO: 8.7 %
NEUTROPHILS # BLD AUTO: 3.21 X10*3/UL (ref 1.6–5.5)
NEUTROPHILS NFR BLD AUTO: 71.8 %
NRBC BLD-RTO: 0 /100 WBCS (ref 0–0)
PLATELET # BLD AUTO: 193 X10*3/UL (ref 150–450)
POTASSIUM SERPL-SCNC: 3.7 MMOL/L (ref 3.5–5.3)
PROT SERPL-MCNC: 8.4 G/DL (ref 6.4–8.2)
PROTHROMBIN TIME: 11.4 SECONDS (ref 9.8–12.8)
RBC # BLD AUTO: 3.74 X10*6/UL (ref 4–5.2)
SODIUM SERPL-SCNC: 134 MMOL/L (ref 136–145)
URATE SERPL-MCNC: 8.7 MG/DL (ref 2.3–6.7)
WBC # BLD AUTO: 4.5 X10*3/UL (ref 4.4–11.3)

## 2023-11-15 PROCEDURE — 80053 COMPREHEN METABOLIC PANEL: CPT | Performed by: PHYSICIAN ASSISTANT

## 2023-11-15 PROCEDURE — 73502 X-RAY EXAM HIP UNI 2-3 VIEWS: CPT | Mod: RT,FR

## 2023-11-15 PROCEDURE — 85730 THROMBOPLASTIN TIME PARTIAL: CPT | Performed by: PHYSICIAN ASSISTANT

## 2023-11-15 PROCEDURE — 99285 EMERGENCY DEPT VISIT HI MDM: CPT | Mod: 25 | Performed by: EMERGENCY MEDICINE

## 2023-11-15 PROCEDURE — 36415 COLL VENOUS BLD VENIPUNCTURE: CPT | Performed by: PHYSICIAN ASSISTANT

## 2023-11-15 PROCEDURE — 73562 X-RAY EXAM OF KNEE 3: CPT | Mod: RT,FR

## 2023-11-15 PROCEDURE — 99284 EMERGENCY DEPT VISIT MOD MDM: CPT | Mod: 25

## 2023-11-15 PROCEDURE — 84550 ASSAY OF BLOOD/URIC ACID: CPT | Performed by: PHYSICIAN ASSISTANT

## 2023-11-15 PROCEDURE — 85610 PROTHROMBIN TIME: CPT | Performed by: PHYSICIAN ASSISTANT

## 2023-11-15 PROCEDURE — 93971 EXTREMITY STUDY: CPT | Mod: FOREIGN READ | Performed by: RADIOLOGY

## 2023-11-15 PROCEDURE — 73502 X-RAY EXAM HIP UNI 2-3 VIEWS: CPT | Mod: RIGHT SIDE | Performed by: RADIOLOGY

## 2023-11-15 PROCEDURE — 85652 RBC SED RATE AUTOMATED: CPT | Performed by: PHYSICIAN ASSISTANT

## 2023-11-15 PROCEDURE — 73562 X-RAY EXAM OF KNEE 3: CPT | Mod: RIGHT SIDE | Performed by: RADIOLOGY

## 2023-11-15 PROCEDURE — 85025 COMPLETE CBC W/AUTO DIFF WBC: CPT | Performed by: PHYSICIAN ASSISTANT

## 2023-11-15 PROCEDURE — 86140 C-REACTIVE PROTEIN: CPT | Performed by: PHYSICIAN ASSISTANT

## 2023-11-15 PROCEDURE — 93971 EXTREMITY STUDY: CPT | Mod: FR

## 2023-11-15 RX ORDER — PREDNISONE 20 MG/1
TABLET ORAL
Qty: 15 TABLET | Refills: 0 | Status: SHIPPED | OUTPATIENT
Start: 2023-11-15 | End: 2023-11-25

## 2023-11-15 RX ORDER — ACETAMINOPHEN 325 MG/1
975 TABLET ORAL ONCE
Status: COMPLETED | OUTPATIENT
Start: 2023-11-15 | End: 2023-11-15

## 2023-11-15 RX ORDER — PREDNISONE 20 MG/1
40 TABLET ORAL ONCE
Status: DISCONTINUED | OUTPATIENT
Start: 2023-11-15 | End: 2023-11-15 | Stop reason: HOSPADM

## 2023-11-15 RX ADMIN — ACETAMINOPHEN 975 MG: 325 TABLET ORAL at 13:20

## 2023-11-15 ASSESSMENT — COLUMBIA-SUICIDE SEVERITY RATING SCALE - C-SSRS
6. HAVE YOU EVER DONE ANYTHING, STARTED TO DO ANYTHING, OR PREPARED TO DO ANYTHING TO END YOUR LIFE?: NO
2. HAVE YOU ACTUALLY HAD ANY THOUGHTS OF KILLING YOURSELF?: NO
1. IN THE PAST MONTH, HAVE YOU WISHED YOU WERE DEAD OR WISHED YOU COULD GO TO SLEEP AND NOT WAKE UP?: NO

## 2023-11-15 ASSESSMENT — PAIN DESCRIPTION - LOCATION: LOCATION: LEG

## 2023-11-15 NOTE — ED PROVIDER NOTES
HPI   Chief Complaint   Patient presents with    Leg Pain       78-year-old female history of gout hypertension diabetes CKD presents with right lower extremity pain and swelling.  Awoke 2 days ago with symptoms.  No trauma or injury.  Swelling of the leg has improved.  Swelling of the knee has persisted.  Pain with weightbearing and having difficulty ambulating.  Requires assistance.  No fall.  She does complain of some hip pain again without injury.  History of gout on allopurinol no other joint involvement.  No prior history of DVT.  No chest pain pleuritic pain cough hemoptysis or shortness of breath.  She has had no fever.  States she feels fine other than her right leg focusing on her knee pain      History provided by:  Patient and relative   used: No                        No data recorded                Patient History   Past Medical History:   Diagnosis Date    Other disorders of lung     Lung trouble    Personal history of other diseases of the circulatory system     History of hypertension    Personal history of other endocrine, nutritional and metabolic disease     History of high cholesterol    Personal history of other endocrine, nutritional and metabolic disease     History of diabetes mellitus    Personal history of other specified conditions     History of heartburn    Personal history of other specified conditions     History of shortness of breath    Sarcoidosis, unspecified     Sarcoidosis     Past Surgical History:   Procedure Laterality Date    HYSTERECTOMY  06/19/2013    Hysterectomy     Family History   Problem Relation Name Age of Onset    Hypertension Other       Social History     Tobacco Use    Smoking status: Not on file    Smokeless tobacco: Not on file   Substance Use Topics    Alcohol use: Not on file    Drug use: Not on file       Physical Exam   ED Triage Vitals [11/15/23 1217]   Temp Heart Rate Resp BP   37.1 °C (98.8 °F) 83 16 152/71      SpO2 Temp src Heart  Rate Source Patient Position   100 % -- -- --      BP Location FiO2 (%)     -- --       Physical Exam  Vitals and nursing note reviewed.   Constitutional:       Appearance: Normal appearance.   Cardiovascular:      Rate and Rhythm: Normal rate.      Pulses: Normal pulses.   Pulmonary:      Effort: Pulmonary effort is normal.   Musculoskeletal:         General: Swelling and tenderness present. No deformity or signs of injury.      Right hip: Tenderness present. No deformity. Normal range of motion.      Left hip: Normal.      Right knee: Swelling and effusion present. No deformity, erythema, ecchymosis or lacerations. Normal range of motion. Tenderness present.      Left knee: Normal.      Right lower leg: Swelling, tenderness and bony tenderness present. No deformity or lacerations. Edema present.      Left lower leg: Normal. No edema.      Right ankle: Normal.      Left ankle: Normal.        Legs:    Skin:     General: Skin is warm and dry.      Findings: No bruising, erythema, lesion or rash.   Neurological:      General: No focal deficit present.      Mental Status: She is alert and oriented to person, place, and time.   Psychiatric:         Mood and Affect: Mood normal.         Behavior: Behavior normal.         Thought Content: Thought content normal.         Judgment: Judgment normal.         ED Course & MDM   ED Course as of 11/15/23 1605   Wed Nov 15, 2023   1524 CBC with normal white count.  Sed rate CRP uric acid elevated.  Chemistries with abnormal kidney function.  Patient with known CKD.  X-ray reports reviewed.  Degenerative changes hip and knee.  Effusion knee.  Ultrasound no DVT.  Positive Baker's cyst   [GA]      ED Course User Index  [GA] Reji Bernardo PA-C         Diagnoses as of 11/15/23 1605   Acute pain of right knee     Labs Reviewed   CBC WITH AUTO DIFFERENTIAL - Abnormal       Result Value    WBC 4.5      nRBC 0.0      RBC 3.74 (*)     Hemoglobin 10.0 (*)     Hematocrit 32.9 (*)      MCV 88      MCH 26.7      MCHC 30.4 (*)     RDW 17.6 (*)     Platelets 193      Neutrophils % 71.8      Immature Granulocytes %, Automated 0.4      Lymphocytes % 16.6      Monocytes % 8.7      Eosinophils % 1.8      Basophils % 0.7      Neutrophils Absolute 3.21      Immature Granulocytes Absolute, Automated 0.02      Lymphocytes Absolute 0.74 (*)     Monocytes Absolute 0.39      Eosinophils Absolute 0.08      Basophils Absolute 0.03     COMPREHENSIVE METABOLIC PANEL - Abnormal    Glucose 157 (*)     Sodium 134 (*)     Potassium 3.7      Chloride 103      Bicarbonate 17 (*)     Anion Gap 18      Urea Nitrogen 54 (*)     Creatinine        eGFR        Calcium 9.0      Albumin 3.3 (*)     Alkaline Phosphatase 130      Total Protein 8.4 (*)     AST 25      Bilirubin, Total 0.4      ALT 24     C-REACTIVE PROTEIN - Abnormal    C-Reactive Protein 8.09 (*)    SEDIMENTATION RATE, AUTOMATED - Abnormal    Sedimentation Rate >130 (*)    URIC ACID - Abnormal    Uric Acid 8.7 (*)    PROTIME-INR - Normal    Protime 11.4      INR 1.0     APTT - Normal    aPTT 36      Narrative:     The APTT is no longer used for monitoring Unfractionated Heparin Therapy. For monitoring Heparin Therapy, use the Heparin Assay.      XR hip right 2 or 3 views   Final Result   Mild superior joint space narrowing consistent with osteoarthritis of   the right hip..   Signed by Julio Cesar Oliva MD      XR knee right 3 views   Final Result   Chondrocalcinosis.   Large joint effusion..   Signed by Julio Cesar Oliva MD      Lower extremity venous duplex right   Final Result   No evidence for DVT within the right lower extremity.   Complex right Baker's cyst 4.3 x 1.4 x 3.7 cm   Signed by Jeffrey Parada MD           Medical Decision Making  Right lower extremity pain and swelling for 2 days.  Tenderness right hip right knee.  Effusion right knee.  Doubt septic joint.  Good range of motion with mild pain.  No erythema or increased warmth.  No crepitus.  Patient  has history of gout.  Labs: As noted on reports.  Elevated sed rate.  Normal white count.  Elevated uric acid.  CKD.  Imaging: Reports as reviewed.  Baker's cyst no DVT.  Degenerative arthritis with effusion of the knee.  Treatment in ED: Tylenol p.o.  Cannot take NSAIDs with CKD    The patient has also been seen and evaluated by the ER physician.  Discharged on prednisone for gout.  Tylenol for pain.  Agrees with ER assessment, disposition and plan.    Evaluation consistent with right knee effusion due to gout.  No DVT.  No fracture dislocation.  Does have degenerative arthritis hip and knee.  No septic joint.  Normal neurovascular exam.  Stable for discharge outpatient management.  Prescribed prednisone taper use of Tylenol.  Follow-up with PCP.    Amount and/or Complexity of Data Reviewed  Labs: ordered. Decision-making details documented in ED Course.  Radiology: ordered. Decision-making details documented in ED Course.    Risk  OTC drugs.  Prescription drug management.        Procedure  Procedures     Reji Bernardo PA-C  11/15/23 204

## 2023-11-15 NOTE — ED TRIAGE NOTES
Pt arrives to ed to r/o blood clot in right leg. Pt cannot bear weight on right side and has swelling. Pt right leg is not hot to touch. Pt denies sob/cp.

## 2023-11-29 ENCOUNTER — LAB (OUTPATIENT)
Dept: LAB | Facility: LAB | Age: 78
End: 2023-11-29
Payer: MEDICARE

## 2023-11-29 DIAGNOSIS — N18.5 CHRONIC KIDNEY DISEASE, STAGE 5 (MULTI): Primary | ICD-10-CM

## 2023-11-29 DIAGNOSIS — D64.9 ANEMIA, UNSPECIFIED TYPE: ICD-10-CM

## 2023-11-29 LAB
25(OH)D3 SERPL-MCNC: 71 NG/ML (ref 30–100)
ALBUMIN SERPL BCP-MCNC: 3.6 G/DL (ref 3.4–5)
ANION GAP SERPL CALC-SCNC: 17 MMOL/L (ref 10–20)
BUN SERPL-MCNC: 59 MG/DL (ref 6–23)
CALCIUM SERPL-MCNC: 8.8 MG/DL (ref 8.6–10.6)
CHLORIDE SERPL-SCNC: 105 MMOL/L (ref 98–107)
CO2 SERPL-SCNC: 18 MMOL/L (ref 21–32)
CREAT SERPL-MCNC: 3.64 MG/DL (ref 0.5–1.05)
ERYTHROCYTE [DISTWIDTH] IN BLOOD BY AUTOMATED COUNT: 19.1 % (ref 11.5–14.5)
ERYTHROCYTE [DISTWIDTH] IN BLOOD BY AUTOMATED COUNT: 19.2 % (ref 11.5–14.5)
ERYTHROCYTE [DISTWIDTH] IN BLOOD BY AUTOMATED COUNT: 19.5 % (ref 11.5–14.5)
FERRITIN SERPL-MCNC: 302 NG/ML (ref 8–150)
GFR SERPL CREATININE-BSD FRML MDRD: 12 ML/MIN/1.73M*2
GLUCOSE SERPL-MCNC: 301 MG/DL (ref 74–99)
HCT VFR BLD AUTO: 31.9 % (ref 36–46)
HCT VFR BLD AUTO: 32.1 % (ref 36–46)
HCT VFR BLD AUTO: 32.4 % (ref 36–46)
HGB BLD-MCNC: 10.1 G/DL (ref 12–16)
HGB BLD-MCNC: 9.8 G/DL (ref 12–16)
HGB BLD-MCNC: 9.8 G/DL (ref 12–16)
IRON SATN MFR SERPL: 11 % (ref 25–45)
IRON SERPL-MCNC: 34 UG/DL (ref 35–150)
MCH RBC QN AUTO: 26.6 PG (ref 26–34)
MCH RBC QN AUTO: 26.8 PG (ref 26–34)
MCH RBC QN AUTO: 27.7 PG (ref 26–34)
MCHC RBC AUTO-ENTMCNC: 30.5 G/DL (ref 32–36)
MCHC RBC AUTO-ENTMCNC: 30.7 G/DL (ref 32–36)
MCHC RBC AUTO-ENTMCNC: 31.2 G/DL (ref 32–36)
MCV RBC AUTO: 87 FL (ref 80–100)
MCV RBC AUTO: 87 FL (ref 80–100)
MCV RBC AUTO: 89 FL (ref 80–100)
NRBC BLD-RTO: 0 /100 WBCS (ref 0–0)
PHOSPHATE SERPL-MCNC: 3.4 MG/DL (ref 2.5–4.9)
PLATELET # BLD AUTO: 140 X10*3/UL (ref 150–450)
PLATELET # BLD AUTO: 144 X10*3/UL (ref 150–450)
PLATELET # BLD AUTO: 150 X10*3/UL (ref 150–450)
POTASSIUM SERPL-SCNC: 4.2 MMOL/L (ref 3.5–5.3)
RBC # BLD AUTO: 3.65 X10*6/UL (ref 4–5.2)
RBC # BLD AUTO: 3.66 X10*6/UL (ref 4–5.2)
RBC # BLD AUTO: 3.68 X10*6/UL (ref 4–5.2)
SODIUM SERPL-SCNC: 136 MMOL/L (ref 136–145)
TIBC SERPL-MCNC: 312 UG/DL (ref 240–445)
UIBC SERPL-MCNC: 278 UG/DL (ref 110–370)
WBC # BLD AUTO: 7.8 X10*3/UL (ref 4.4–11.3)
WBC # BLD AUTO: 8 X10*3/UL (ref 4.4–11.3)
WBC # BLD AUTO: 8.1 X10*3/UL (ref 4.4–11.3)

## 2023-11-29 PROCEDURE — 82728 ASSAY OF FERRITIN: CPT

## 2023-11-29 PROCEDURE — 36415 COLL VENOUS BLD VENIPUNCTURE: CPT

## 2023-11-29 PROCEDURE — 85027 COMPLETE CBC AUTOMATED: CPT

## 2023-11-29 PROCEDURE — 80069 RENAL FUNCTION PANEL: CPT

## 2023-11-29 PROCEDURE — 83970 ASSAY OF PARATHORMONE: CPT

## 2023-11-29 PROCEDURE — 82306 VITAMIN D 25 HYDROXY: CPT

## 2023-11-29 PROCEDURE — 83550 IRON BINDING TEST: CPT

## 2023-11-29 PROCEDURE — 83540 ASSAY OF IRON: CPT

## 2023-11-30 DIAGNOSIS — E21.3 HYPERPARATHYROIDISM (MULTI): Primary | ICD-10-CM

## 2023-11-30 DIAGNOSIS — M79.89 LEG SWELLING: Primary | ICD-10-CM

## 2023-11-30 LAB — PTH-INTACT SERPL-MCNC: 232.3 PG/ML (ref 18.5–88)

## 2023-11-30 RX ORDER — FUROSEMIDE 40 MG/1
40 TABLET ORAL AS NEEDED
Qty: 60 TABLET | Refills: 0 | Status: SHIPPED | OUTPATIENT
Start: 2023-11-30 | End: 2024-11-29

## 2023-11-30 RX ORDER — CINACALCET 30 MG/1
30 TABLET, FILM COATED ORAL
Qty: 30 TABLET | Refills: 1 | Status: SHIPPED | OUTPATIENT
Start: 2023-12-01 | End: 2024-05-29

## 2023-12-01 ENCOUNTER — APPOINTMENT (OUTPATIENT)
Dept: INFUSION THERAPY | Facility: CLINIC | Age: 78
End: 2023-12-01
Payer: MEDICARE

## 2023-12-08 ENCOUNTER — APPOINTMENT (OUTPATIENT)
Dept: INFUSION THERAPY | Facility: CLINIC | Age: 78
End: 2023-12-08
Payer: MEDICARE

## 2023-12-18 ENCOUNTER — INFUSION (OUTPATIENT)
Dept: INFUSION THERAPY | Facility: CLINIC | Age: 78
End: 2023-12-18
Payer: MEDICARE

## 2023-12-18 VITALS
WEIGHT: 141.09 LBS | SYSTOLIC BLOOD PRESSURE: 200 MMHG | HEART RATE: 80 BPM | RESPIRATION RATE: 16 BRPM | OXYGEN SATURATION: 98 % | TEMPERATURE: 97.6 F | BODY MASS INDEX: 25.81 KG/M2 | DIASTOLIC BLOOD PRESSURE: 69 MMHG

## 2023-12-18 DIAGNOSIS — D64.9 ANEMIA, UNSPECIFIED TYPE: ICD-10-CM

## 2023-12-18 ASSESSMENT — ENCOUNTER SYMPTOMS
ABDOMINAL PAIN: 0
DYSURIA: 0
ARTHRALGIAS: 0
LEG SWELLING: 0
SHORTNESS OF BREATH: 0
HEMATOLOGIC/LYMPHATIC NEGATIVE: 1
NUMBNESS: 0
MYALGIAS: 0
APPETITE CHANGE: 0
NEUROLOGICAL NEGATIVE: 1
EXTREMITY WEAKNESS: 0
EYES NEGATIVE: 1
WHEEZING: 0
FREQUENCY: 0
VOICE CHANGE: 0
CONSTIPATION: 0
CARDIOVASCULAR NEGATIVE: 1
TROUBLE SWALLOWING: 0
DIARRHEA: 0
MUSCULOSKELETAL NEGATIVE: 1
SORE THROAT: 0
GASTROINTESTINAL NEGATIVE: 1
CHILLS: 0
VOMITING: 0
PALPITATIONS: 0
CONSTITUTIONAL NEGATIVE: 1
HEADACHES: 0
WOUND: 0
PSYCHIATRIC NEGATIVE: 1
FEVER: 0
FATIGUE: 0
COUGH: 0
DIZZINESS: 0
NAUSEA: 0
BLOOD IN STOOL: 0
RESPIRATORY NEGATIVE: 1
UNEXPECTED WEIGHT CHANGE: 0
LIGHT-HEADEDNESS: 0
HEMATURIA: 0
EYE PROBLEMS: 0

## 2023-12-18 NOTE — PROGRESS NOTES
[facility]   infusion Clinic Note   Date: 2023   Name: Vida May  : 1945   MRN: [id number]         Reason for Visit: Injections (Weekly Procrit [id number] subcutaneous injection/ Not given this visit/ Blood pressure readings have been elevated/  200/69 after sitting for 30minutes)       Visit Type: INJECTION     Ordered By:  Tucker Pepper DO   Accompanied by:Relative      Diagnosis: Anemia, unspecified type      Allergies:   Allergies as of 2023    (No Known Allergies)        Current Medications has a current medication list which includes the following prescription(s): allopurinol, amlodipine, aspirin, atorvastatin, cinacalcet, clonidine, cyclobenzaprine, epoetin abhinav, ergocalciferol, ferrous sulfate (325 mg ferrous sulfate), furosemide, glipizide, hydralazine, lidocaine, losartan, metformin, metoprolol tartrate, mupirocin, omeprazole, oxycodone-acetaminophen, pravastatin, and sodium bicarbonate.          Vitals:  Vitals:    23 1525   BP: (!) 200/69   BP Location: Left arm   Patient Position: Sitting   BP Cuff Size: Adult   Pulse: 80   Resp: 16   Temp: 36.4 °C (97.6 °F)   TempSrc: Temporal   SpO2: 98%   Weight: 64 kg (141 lb 1.5 oz)          Infusion Pre-procedure Checklist:   Allergies reviewed: yes   Medications reviewed: yes     Previous reaction to current treatment: No      Assess patient for the concerns below. Document provider notification as appropriate.  - Active or recent infection with/without current antibiotic use: no  - Recent or planned invasive dental work: no  - Recent or planned surgeries: no  - Recently received or plans to receive vaccinations: no  - Has treatment related toxicities: no  - Is pregnant:  n/a    - Does the patient meet criteria to treat? Yes    Provider notified? Not applicable      Pain:  0/10   Is the pain different from normal: n/a   Is the pain tolerable: n/a   Is your Doctor aware: n/a       Labs:  2023 Hgb  9.8      Fall Risk Screening: Janette Fall Risk  History of Falling, Immediate or Within 3 Months: Yes  Ambulatory Aid: Walks without aid/bedrest/nurse assist  Intravenous Therapy/Heparin Lock: No  Gait/Transferring: Normal/bedrest/immobile  Mental Status: Oriented to own ability       Review of Systems   Constitutional: Negative.  Negative for appetite change, chills, fatigue, fever and unexpected weight change.   HENT:  Negative.  Negative for hearing loss, mouth sores, sore throat, tinnitus, trouble swallowing and voice change.    Eyes: Negative.  Negative for eye problems.        Wears glasses   Respiratory: Negative.  Negative for cough, shortness of breath and wheezing.         Can become SOB on exertion   Cardiovascular: Negative.  Negative for chest pain, leg swelling and palpitations.   Gastrointestinal: Negative.  Negative for abdominal pain, blood in stool, constipation, diarrhea, nausea and vomiting.   Genitourinary: Negative.  Negative for dysuria, frequency and hematuria.    Musculoskeletal: Negative.  Negative for arthralgias and myalgias.   Skin: Negative.  Negative for itching, rash and wound.   Neurological: Negative.  Negative for dizziness, extremity weakness, headaches, light-headedness and numbness.   Hematological: Negative.    Psychiatric/Behavioral: Negative.           Infusion Readiness:   Assessment Concerns Related to Infusion: No (injection)  Provider notified: n/a      Document Below Only If Indicated:   New Patient Education:         Drug Specific Questions:  Epoetin Celestino (EPOGEN. PROCRIT, RETACRIT)  IS SBP > 160 OR 30 MM/HG GREATER THAN LAST RECORDED SBP? No  (PT SHOULD UNDERGO REGULAR BP MONITORING)    PT INFORMED OF INCREASED RISKS OF MORTALITY, SERIOUS CARDIOVASCULAR REACTIONS, THEROMBOEMBOLIC REACTIONS, STROKE AND TUMOR PROGRESSION?   Yes    PT INFORMED TO CONTACT PROVIDER FOR NEW ONSET NEUROLOGIC S/S OR CHANGES IN SEIZURE FREQUENCY? Yes    PT AWARE OF NEED TO HAVE REGULAR LAB TESTS  TO MONITOR HEMOGLOBIN? Yes     Weight Based Drug Calculations:  Flat dose          Initiated By: Camille Hansen LPN   Time: 3:47 PM     Vdia May had no medications administered during this visit.

## 2023-12-20 DIAGNOSIS — D63.1 ANEMIA IN STAGE 5 CHRONIC KIDNEY DISEASE, NOT ON CHRONIC DIALYSIS (MULTI): Primary | ICD-10-CM

## 2023-12-20 DIAGNOSIS — N18.5 ANEMIA IN STAGE 5 CHRONIC KIDNEY DISEASE, NOT ON CHRONIC DIALYSIS (MULTI): Primary | ICD-10-CM

## 2023-12-27 ENCOUNTER — APPOINTMENT (OUTPATIENT)
Dept: INFUSION THERAPY | Facility: CLINIC | Age: 78
End: 2023-12-27
Payer: MEDICARE

## 2024-01-03 ENCOUNTER — LAB (OUTPATIENT)
Dept: LAB | Facility: LAB | Age: 79
End: 2024-01-03
Payer: MEDICARE

## 2024-01-03 DIAGNOSIS — D64.9 ANEMIA, UNSPECIFIED TYPE: ICD-10-CM

## 2024-01-03 LAB
ERYTHROCYTE [DISTWIDTH] IN BLOOD BY AUTOMATED COUNT: 18.8 % (ref 11.5–14.5)
ERYTHROCYTE [DISTWIDTH] IN BLOOD BY AUTOMATED COUNT: 18.9 % (ref 11.5–14.5)
HCT VFR BLD AUTO: 28.2 % (ref 36–46)
HCT VFR BLD AUTO: 28.6 % (ref 36–46)
HGB BLD-MCNC: 8.6 G/DL (ref 12–16)
HGB BLD-MCNC: 8.7 G/DL (ref 12–16)
MCH RBC QN AUTO: 28 PG (ref 26–34)
MCH RBC QN AUTO: 28.3 PG (ref 26–34)
MCHC RBC AUTO-ENTMCNC: 30.4 G/DL (ref 32–36)
MCHC RBC AUTO-ENTMCNC: 30.5 G/DL (ref 32–36)
MCV RBC AUTO: 92 FL (ref 80–100)
MCV RBC AUTO: 93 FL (ref 80–100)
NRBC BLD-RTO: 0 /100 WBCS (ref 0–0)
NRBC BLD-RTO: 0 /100 WBCS (ref 0–0)
PLATELET # BLD AUTO: 145 X10*3/UL (ref 150–450)
PLATELET # BLD AUTO: 149 X10*3/UL (ref 150–450)
RBC # BLD AUTO: 3.04 X10*6/UL (ref 4–5.2)
RBC # BLD AUTO: 3.11 X10*6/UL (ref 4–5.2)
WBC # BLD AUTO: 5.3 X10*3/UL (ref 4.4–11.3)
WBC # BLD AUTO: 5.3 X10*3/UL (ref 4.4–11.3)

## 2024-01-03 PROCEDURE — 36415 COLL VENOUS BLD VENIPUNCTURE: CPT

## 2024-01-03 PROCEDURE — 85027 COMPLETE CBC AUTOMATED: CPT

## 2024-01-04 ENCOUNTER — INFUSION (OUTPATIENT)
Dept: INFUSION THERAPY | Facility: CLINIC | Age: 79
End: 2024-01-04
Payer: MEDICARE

## 2024-01-04 VITALS
TEMPERATURE: 97.3 F | BODY MASS INDEX: 26.29 KG/M2 | WEIGHT: 143.74 LBS | HEART RATE: 60 BPM | SYSTOLIC BLOOD PRESSURE: 173 MMHG | DIASTOLIC BLOOD PRESSURE: 79 MMHG | RESPIRATION RATE: 18 BRPM | OXYGEN SATURATION: 99 %

## 2024-01-04 DIAGNOSIS — D64.9 ANEMIA, UNSPECIFIED TYPE: ICD-10-CM

## 2024-01-04 NOTE — LETTER
Patient: Vida May   YOB: 1945   Date of Visit: 2024     Chanelle Ba I wanted to provide an update on patient Vida May. Patient has not received her procrit injection since 10/19/23. Pt had a hospitalization in November in addition to 7 cancelled or no show visits here at the [facility]. During patients last 2 appointments here on 23 and 24 procrit was held as therapy plan dictates to hold if SBP > 160.  BP on 23 was 200/69.  Today /81 and 173/79 after sitting for approximately 15 minutes. Pt asymptomatic in r/t BP and instructed to follow up with PCP for further eval. Pt does have another appointment scheduled here on 24 for procrit injection. Last hemoglobin 8.6 Please advise if you would like to make any changes to patients therapy plan.     Thank you,     Alysha GOODE NP            CC: Camille Medrano LPN  ______________________________________________________________________________________    [facility]   infusion Clinic Note   Date: 2024   Name: Vida May  : 1945   MRN: [id number]         Reason for Visit: Injections (Weekly Procrit 33229 subcutaneous injection/ Hold Hgb > / 1-3-2024 Hgb 8.7/ Hgb monthly//Procrit not given today due to elevated blood pressure readings/ On admission: 204/85 (left arm)and 208/81(right arm)/ 15min post: 173/79 (left arm))         Visit Type: INJECTION      Ordered By: DR DAFNE LUEVANO       Accompanied by:Relative      Diagnosis: Anemia, unspecified type       Allergies:   Allergies as of 2024   • (No Known Allergies)         Current Medications has a current medication list which includes the following prescription(s): allopurinol, amlodipine, aspirin, atorvastatin, cinacalcet, clonidine, cyclobenzaprine, epoetin abhinav, epoetin abhinav-epbx, ergocalciferol, ferrous sulfate (325 mg ferrous sulfate), furosemide, glipizide, hydralazine, lidocaine, losartan,  "metformin, metoprolol tartrate, mupirocin, omeprazole, oxycodone-acetaminophen, pravastatin, and sodium bicarbonate.       Vitals:  Vitals:    01/04/24 1550 01/04/24 1605   BP: (!) 208/81 173/79   BP Location: Left arm Left arm   Patient Position: Sitting Sitting   BP Cuff Size: Adult Adult   Pulse: 71 60   Resp: 18 18   Temp: 36.3 °C (97.3 °F)    TempSrc: Temporal    SpO2: 99% 99%   Weight: 65.2 kg (143 lb 11.8 oz)              Fall Risk Screening: Savage Fall Risk  History of Falling, Immediate or Within 3 Months: Yes  Ambulatory Aid: Walks without aid/bedrest/nurse assist  Intravenous Therapy/Heparin Lock: No  Gait/Transferring: Normal/bedrest/immobile  Mental Status: Oriented to own ability             Infusion Readiness:   - Assessment Concerns Related to Infusion: Yes  - Provider notified: yes      Document Below Only If Indicated:   New Patient Education:    N/A (returning patient for continuation of therapy. Ongoing education provided as needed.)        Treatment Conditions & Drug Specific Questions:    Epoetin Celestino (EPOGEN. PROCRIT, RETACRIT)    TREATMENT CONDITIONS:    Unless otherwise specified on patient specific therapy plan HOLD and notify provider prior to proceeding with today's injection if:  o Hemoglobin GREATER THAN 11 g/dL (parameter set by prescribing provider)  o Increase in hemoglobin GREATER THAN 1 g/dL   o SBP GREATER THAN 160 mmHg    Lab Results   Component Value Date/Time    HGB 8.6 (L) 01/03/2024 1155    HGB 8.7 (L) 01/03/2024 1155    HGB 9.8 (L) 11/29/2023 1136    HGB 10.1 (L) 11/29/2023 1136    HGB 9.8 (L) 11/29/2023 1136    HGB 8.1 (A) 10/19/2023 1631     No results found for: \"HGBCAP\"     Labs reviewed and patient meets treatment conditions? Yes    DRUG SPECIFIC QUESTIONS:  - Does the patient have uncontrolled hypertension?  Yes    (Use is contraindicated in patients with uncontrolled hypertension)        Initiated By:LANEY May had no medications " administered during this visit.    _________________________________________________________________________    Note authored and patient cared for by: LANEY FAUSTIN LPN    Note/Encounter reviewed by: Alysha GOODE NP. This provider on site at time of patient encounter. Staff to notify this provider of any questions, concerns, abnormals or issues during encounter. Notified of elevated BP. Treatment postponed as pt did not meet treatment criteria.

## 2024-01-04 NOTE — PATIENT INSTRUCTIONS
Today :Vida May had no medications administered during this visit.     For:   1. Anemia, unspecified type         Your next appointment is due in:  please follow up with your PCP regarding your blood pressure.         Please read the  Medication Guide that was given to you and reviewed during todays visit.     (Tell all doctors including dentists that you are taking this medication)     Go to the emergency room or call 911 if:  -You have signs of allergic reaction:   -Rash, hives, itching.   -Swollen, blistered, peeling skin.   -Swelling of face, lips, mouth, tongue or throat.   -Tightness of chest, trouble breathing, swallowing or talking     Call your doctor:  - If IV / injection site gets red, warm, swollen, itchy or leaks fluid or pus.     (Leave dressing on your IV site for at least 2 hours and keep area clean and dry  - If you get sick or have symptoms of infection or are not feeling well for any reason.    (Wash your hands often, stay away from people who are sick)  - If you have side effects from your medication that do not go away or are bothersome.     (Refer to the teaching your nurse gave you for side effects to call your doctor about)    - Common side effects may include:  stuffy nose, headache, feeling tired, muscle aches, upset stomach  - Before receiving any vaccines     - [facility] at   If:  - You get sick, are on antibiotics, have had a recent vaccine, have surgery or dental work and your doctor wants your visit rescheduled.  - You need to cancel and reschedule your visit for any reason. Call at least 2 days before your visit if you need to cancel.   - Your insurance changes before your next visit.    (We will need to get approval from your new insurance. This can take up to two weeks.)     The [facility] is opened Monday thru Friday. We are closed on weekends and holidays.   Voice mail will take your call if the center is closed. If you leave a  message please allow 24 hours for a call back during weekdays. If you leave a message on a weekend/holiday, we will call you back the next business day.

## 2024-01-04 NOTE — PROGRESS NOTES
[facility]   infusion Clinic Note   Date: 2024   Name: Vida May  : 1945   MRN: [id number]         Reason for Visit: Injections (Weekly Procrit 32932 subcutaneous injection/ Hold Hgb > 11/ -3- Hgb 8.7/ Hgb monthly//Procrit not given today due to elevated blood pressure readings/ On admission: 204/85 (left arm)and 208/81(right arm)/ 15min post: 173/79 (left arm))         Visit Type: INJECTION      Ordered By: DR DAFNE LUEVANO       Accompanied by:Relative      Diagnosis: Anemia, unspecified type       Allergies:   Allergies as of 2024    (No Known Allergies)         Current Medications has a current medication list which includes the following prescription(s): allopurinol, amlodipine, aspirin, atorvastatin, cinacalcet, clonidine, cyclobenzaprine, epoetin abhinav, epoetin abhinav-epbx, ergocalciferol, ferrous sulfate (325 mg ferrous sulfate), furosemide, glipizide, hydralazine, lidocaine, losartan, metformin, metoprolol tartrate, mupirocin, omeprazole, oxycodone-acetaminophen, pravastatin, and sodium bicarbonate.       Vitals:  Vitals:    24 1550 24 1605   BP: (!) 208/81 173/79   BP Location: Left arm Left arm   Patient Position: Sitting Sitting   BP Cuff Size: Adult Adult   Pulse: 71 60   Resp: 18 18   Temp: 36.3 °C (97.3 °F)    TempSrc: Temporal    SpO2: 99% 99%   Weight: 65.2 kg (143 lb 11.8 oz)              Fall Risk Screening: Savage Fall Risk  History of Falling, Immediate or Within 3 Months: Yes  Ambulatory Aid: Walks without aid/bedrest/nurse assist  Intravenous Therapy/Heparin Lock: No  Gait/Transferring: Normal/bedrest/immobile  Mental Status: Oriented to own ability             Infusion Readiness:   - Assessment Concerns Related to Infusion: Yes  - Provider notified: yes      Document Below Only If Indicated:   New Patient Education:    N/A (returning patient for continuation of therapy. Ongoing education provided as needed.)        Treatment  "Conditions & Drug Specific Questions:    Epoetin Celestino (EPOGEN. PROCRIT, RETACRIT)    TREATMENT CONDITIONS:    Unless otherwise specified on patient specific therapy plan HOLD and notify provider prior to proceeding with today's injection if:  o Hemoglobin GREATER THAN 11 g/dL (parameter set by prescribing provider)  o Increase in hemoglobin GREATER THAN 1 g/dL   o SBP GREATER THAN 160 mmHg    Lab Results   Component Value Date/Time    HGB 8.6 (L) 01/03/2024 1155    HGB 8.7 (L) 01/03/2024 1155    HGB 9.8 (L) 11/29/2023 1136    HGB 10.1 (L) 11/29/2023 1136    HGB 9.8 (L) 11/29/2023 1136    HGB 8.1 (A) 10/19/2023 1631     No results found for: \"HGBCAP\"     Labs reviewed and patient meets treatment conditions? Yes    DRUG SPECIFIC QUESTIONS:  - Does the patient have uncontrolled hypertension?  Yes    (Use is contraindicated in patients with uncontrolled hypertension)        Initiated By:LANEY Chávezra Yadira had no medications administered during this visit.    _________________________________________________________________________    Note authored and patient cared for by: LANEY FAUSTIN LPN    Note/Encounter reviewed by: Alysha GOODE NP. This provider on site at time of patient encounter. Staff to notify this provider of any questions, concerns, abnormals or issues during encounter. Notified of elevated BP. Treatment postponed as pt did not meet treatment criteria.  "

## 2024-01-05 DIAGNOSIS — I12.9 HYPERTENSIVE CHRONIC KIDNEY DISEASE WITH STAGE 1 THROUGH STAGE 4 CHRONIC KIDNEY DISEASE, OR UNSPECIFIED CHRONIC KIDNEY DISEASE: Primary | ICD-10-CM

## 2024-01-05 RX ORDER — PRAZOSIN HYDROCHLORIDE 1 MG/1
2 CAPSULE ORAL 2 TIMES DAILY
Qty: 120 CAPSULE | Refills: 4 | Status: SHIPPED | OUTPATIENT
Start: 2024-01-05 | End: 2024-02-29 | Stop reason: SDUPTHER

## 2024-01-11 ENCOUNTER — APPOINTMENT (OUTPATIENT)
Dept: INFUSION THERAPY | Facility: CLINIC | Age: 79
End: 2024-01-11
Payer: MEDICARE

## 2024-01-18 ENCOUNTER — INFUSION (OUTPATIENT)
Dept: INFUSION THERAPY | Facility: CLINIC | Age: 79
End: 2024-01-18
Payer: MEDICARE

## 2024-01-18 VITALS
OXYGEN SATURATION: 97 % | DIASTOLIC BLOOD PRESSURE: 73 MMHG | TEMPERATURE: 98.6 F | BODY MASS INDEX: 26.21 KG/M2 | SYSTOLIC BLOOD PRESSURE: 150 MMHG | RESPIRATION RATE: 16 BRPM | WEIGHT: 143.3 LBS | HEART RATE: 72 BPM

## 2024-01-18 DIAGNOSIS — D64.9 ANEMIA, UNSPECIFIED TYPE: ICD-10-CM

## 2024-01-18 PROCEDURE — 96372 THER/PROPH/DIAG INJ SC/IM: CPT | Performed by: NURSE PRACTITIONER

## 2024-01-18 RX ORDER — DIPHENHYDRAMINE HYDROCHLORIDE 50 MG/ML
50 INJECTION INTRAMUSCULAR; INTRAVENOUS AS NEEDED
Status: CANCELLED | OUTPATIENT
Start: 2024-01-25

## 2024-01-18 RX ORDER — ALBUTEROL SULFATE 0.83 MG/ML
3 SOLUTION RESPIRATORY (INHALATION) AS NEEDED
Status: CANCELLED | OUTPATIENT
Start: 2024-01-25

## 2024-01-18 RX ORDER — FAMOTIDINE 10 MG/ML
20 INJECTION INTRAVENOUS ONCE AS NEEDED
Status: CANCELLED | OUTPATIENT
Start: 2024-01-25

## 2024-01-18 RX ORDER — EPINEPHRINE 0.3 MG/.3ML
0.3 INJECTION SUBCUTANEOUS EVERY 5 MIN PRN
Status: CANCELLED | OUTPATIENT
Start: 2024-01-25

## 2024-01-18 ASSESSMENT — ENCOUNTER SYMPTOMS
CONSTIPATION: 0
BACK PAIN: 0
DIZZINESS: 0
SLEEP DISTURBANCE: 0
CARDIOVASCULAR NEGATIVE: 1
APPETITE CHANGE: 0
FATIGUE: 0
SORE THROAT: 0
BLOOD IN STOOL: 0
DIARRHEA: 0
PALPITATIONS: 0
FREQUENCY: 0
DYSURIA: 0
VOICE CHANGE: 0
VOMITING: 0
HEMATURIA: 0
ENDOCRINE NEGATIVE: 1
CHILLS: 0
EXTREMITY WEAKNESS: 0
LIGHT-HEADEDNESS: 0
ARTHRALGIAS: 0
ABDOMINAL PAIN: 0
MYALGIAS: 0
PSYCHIATRIC NEGATIVE: 1
COUGH: 0
NERVOUS/ANXIOUS: 0
SHORTNESS OF BREATH: 0
HEMATOLOGIC/LYMPHATIC NEGATIVE: 1
EYE PROBLEMS: 0
EYES NEGATIVE: 1
LEG SWELLING: 0
NAUSEA: 0
HEADACHES: 0
UNEXPECTED WEIGHT CHANGE: 0
WHEEZING: 0
DEPRESSION: 0
WOUND: 0
TROUBLE SWALLOWING: 0
FEVER: 0
NUMBNESS: 0

## 2024-01-18 NOTE — PATIENT INSTRUCTIONS
Today :We administered epoetin abhinav. Procrit [id number] subcutaneous injection left upper arm    Hold Hgb > 11   1-3-2024 Hgb 8.7   Hgb every 4 weeks    For:   1. Anemia, unspecified type       Your next appointment is due in:  1 week     Please read the  Medication Guide that was given to you.     (Tell all doctors including dentists that you are taking this medication)     Go to the emergency room or call 911 if:  -You have signs of allergic reaction:   -Rash, hives, itching.   -Swollen, blistered, peeling skin.   -Swelling of face, lips, mouth, tongue or throat.   -Tightness of chest, trouble breathing, swallowing or talking     Call your doctor:  - If injection site gets red, warm, swollen, itchy or leaks fluid or pus.     (Leave band aid on your injection site for at least 2 hours and keep area clean and dry  - If you get sick or have symptoms of infection or are not feeling well for any reason.    (Wash your hands often, stay away from people who are sick)  - If you have side effects from your medication that do not go away or are bothersome.     (Refer to the teaching your nurse gave you for side effects to call your doctor about)    - Common side effects may include:  stuffy nose, headache, feeling tired, muscle aches, upset stomach  - Before receiving any vaccines     - Call [facility] at   If:  - You get sick, are on antibiotics, have had a recent vaccine, have surgery or dental work and your doctor wants your visit rescheduled.  - You need to cancel and reschedule your visit for any reason. Call at least 2 days before your visit if you need to cancel.   - Your insurance changes before your next visit.    (We will need to get approval from your new insurance. This can take up to two weeks.)     The [facility] is opened Monday thru Friday. We are closed on weekends and holidays.   Voice mail will take your call if the center is closed. If you leave a message please  allow 24 hours for a call back during weekdays. If you leave a message on a weekend/holiday, we will call you back the next business day.

## 2024-01-18 NOTE — PROGRESS NOTES
[facility]   infusion Clinic Note   Date: 2024   Name: Vida May  : 1945   MRN: [id number]         Reason for Visit: Injections (Every week Procrit [id number] subcutaneous injection)       Visit Type: INJECTION     Ordered By:  Tucker Pepper DO   Accompanied by:Relative      Diagnosis: Anemia, unspecified type      Allergies:   Allergies as of 2024    (No Known Allergies)        Current Medications has a current medication list which includes the following prescription(s): allopurinol, amlodipine, aspirin, atorvastatin, cinacalcet, clonidine, cyclobenzaprine, epoetin abhinav, epoetin abhinav-epbx, ergocalciferol, ferrous sulfate (325 mg ferrous sulfate), furosemide, glipizide, hydralazine, lidocaine, losartan, metformin, metoprolol tartrate, mupirocin, omeprazole, oxycodone-acetaminophen, pravastatin, prazosin, and sodium bicarbonate.          Vitals:  Vitals:    24 1617   BP: 150/73   BP Location: Left arm   Patient Position: Sitting   BP Cuff Size: Adult   Pulse: 72   Resp: 16   Temp: 37 °C (98.6 °F)   TempSrc: Temporal   SpO2: 97%   Weight: 65 kg (143 lb 4.8 oz)          Infusion Pre-procedure Checklist:   Allergies reviewed: yes   Medications reviewed: yes     Previous reaction to current treatment: No      Assess patient for the concerns below. Document provider notification as appropriate.  - Active or recent infection with/without current antibiotic use: no  - Recent or planned invasive dental work: no  - Recent or planned surgeries: no  - Recently received or plans to receive vaccinations: no  - Has treatment related toxicities: no  - Is pregnant:  n/a    - Does the patient meet criteria to treat? Yes    Provider notified? No      Pain: 0    Is the pain different from normal: n/a   Is the pain tolerable: n/a   Is your Doctor aware: n/a       Labs:  1-3-2024 Hgb 8.7      Fall Risk Screening: Janette Fall Risk  History of Falling, Immediate or Within 3 Months:  Yes  Ambulatory Aid: Walks without aid/bedrest/nurse assist  Intravenous Therapy/Heparin Lock: No  Gait/Transferring: Normal/bedrest/immobile  Mental Status: Oriented to own ability       Review of Systems   Constitutional:  Negative for appetite change, chills, fatigue, fever and unexpected weight change.   HENT:  Negative.  Negative for hearing loss, mouth sores, sore throat, tinnitus, trouble swallowing and voice change.    Eyes: Negative.  Negative for eye problems.        Wears glasses   Respiratory:  Negative for cough, shortness of breath and wheezing.         Can become SOB on exertion   Cardiovascular: Negative.  Negative for chest pain, leg swelling and palpitations.   Gastrointestinal:  Negative for abdominal pain, blood in stool, constipation, diarrhea, nausea and vomiting.   Endocrine: Negative.    Genitourinary: Negative.  Negative for dysuria, frequency and hematuria.    Musculoskeletal:  Negative for arthralgias, back pain and myalgias.   Skin: Negative.  Negative for itching, rash and wound.   Neurological:  Negative for dizziness, extremity weakness, headaches, light-headedness and numbness.   Hematological: Negative.    Psychiatric/Behavioral: Negative.  Negative for depression and sleep disturbance. The patient is not nervous/anxious.          Infusion Readiness:   Assessment Concerns Related to Infusion: No  Provider notified: no      Document Below Only If Indicated:   New Patient Education:  N/A (returning patient for continuation of therapy. Ongoing education provided as needed.)       Drug Specific Questions:  Epoetin Celestino (EPOGEN. PROCRIT, RETACRIT)  IS SBP > 160 OR 30 MM/HG GREATER THAN LAST RECORDED SBP? NO  (PT SHOULD UNDERGO REGULAR BP MONITORING)    PT INFORMED OF INCREASED RISKS OF MORTALITY, SERIOUS CARDIOVASCULAR REACTIONS, THEROMBOEMBOLIC REACTIONS, STROKE AND TUMOR PROGRESSION? YES    PT INFORMED TO CONTACT PROVIDER FOR NEW ONSET NEUROLOGIC S/S OR CHANGES IN SEIZURE FREQUENCY?  YES    PT AWARE OF NEED TO HAVE REGULAR LAB TESTS TO MONITOR HEMOGLOBIN? YES     Weight Based Drug Calculations:  WEIGHT BASED DRUGS: NOT APPLICABLE           Initiated By: Camille Hansen LPN   Time: 4:33 PM     We administered epoetin abhinav.

## 2024-01-25 ENCOUNTER — APPOINTMENT (OUTPATIENT)
Dept: INFUSION THERAPY | Facility: CLINIC | Age: 79
End: 2024-01-25
Payer: MEDICARE

## 2024-02-01 ENCOUNTER — APPOINTMENT (OUTPATIENT)
Dept: INFUSION THERAPY | Facility: CLINIC | Age: 79
End: 2024-02-01
Payer: MEDICARE

## 2024-02-09 ENCOUNTER — INFUSION (OUTPATIENT)
Dept: INFUSION THERAPY | Facility: CLINIC | Age: 79
End: 2024-02-09
Payer: MEDICARE

## 2024-02-09 DIAGNOSIS — D64.9 ANEMIA, UNSPECIFIED TYPE: ICD-10-CM

## 2024-02-12 ENCOUNTER — LAB (OUTPATIENT)
Dept: LAB | Facility: LAB | Age: 79
End: 2024-02-12
Payer: MEDICARE

## 2024-02-12 DIAGNOSIS — D64.9 ANEMIA, UNSPECIFIED TYPE: ICD-10-CM

## 2024-02-12 LAB
ERYTHROCYTE [DISTWIDTH] IN BLOOD BY AUTOMATED COUNT: 16 % (ref 11.5–14.5)
HCT VFR BLD AUTO: 28.2 % (ref 36–46)
HGB BLD-MCNC: 9 G/DL (ref 12–16)
MCH RBC QN AUTO: 29.8 PG (ref 26–34)
MCHC RBC AUTO-ENTMCNC: 31.9 G/DL (ref 32–36)
MCV RBC AUTO: 93 FL (ref 80–100)
NRBC BLD-RTO: 0 /100 WBCS (ref 0–0)
PLATELET # BLD AUTO: 135 X10*3/UL (ref 150–450)
RBC # BLD AUTO: 3.02 X10*6/UL (ref 4–5.2)
WBC # BLD AUTO: 4.5 X10*3/UL (ref 4.4–11.3)

## 2024-02-12 PROCEDURE — 85027 COMPLETE CBC AUTOMATED: CPT

## 2024-02-12 PROCEDURE — 36415 COLL VENOUS BLD VENIPUNCTURE: CPT

## 2024-02-15 ENCOUNTER — INFUSION (OUTPATIENT)
Dept: INFUSION THERAPY | Facility: CLINIC | Age: 79
End: 2024-02-15
Payer: MEDICARE

## 2024-02-15 VITALS
TEMPERATURE: 97.3 F | OXYGEN SATURATION: 97 % | HEART RATE: 57 BPM | SYSTOLIC BLOOD PRESSURE: 183 MMHG | DIASTOLIC BLOOD PRESSURE: 72 MMHG | RESPIRATION RATE: 16 BRPM | WEIGHT: 147.05 LBS | BODY MASS INDEX: 26.9 KG/M2

## 2024-02-15 DIAGNOSIS — D64.9 ANEMIA, UNSPECIFIED TYPE: ICD-10-CM

## 2024-02-15 NOTE — PROGRESS NOTES
[facility]   infusion Clinic Note   Date: February 15, 2024   Name: Vida May  : 1945   MRN: [id number]         Reason for Visit: Injections (PT. HER FOR WEEKLY PROCRIT INJECTION, PT. DID NOT MEET TREATMENT PARAMETERS AEB ELEVATED BP ABOVE 160 SYSTOLIC./ALYSHA SARMIENTO NP AWARE. WILL NOTIFY DR. LUEVANO./PT. AND DAUGHTER AWARE AND ACCEPTING./PT. STATES SHE DID TAKE BP MEDS TODAY.)         Visit Type: INJECTION       Ordered By: DR LUEVANO      Accompanied by:Relative      Diagnosis: Anemia, unspecified type       Allergies:   Allergies as of 02/15/2024    (No Known Allergies)         Current Medications has a current medication list which includes the following prescription(s): allopurinol, amlodipine, aspirin, atorvastatin, cinacalcet, clonidine, cyclobenzaprine, epoetin abhinav, epoetin abhinav-epbx, ergocalciferol, ferrous sulfate (325 mg ferrous sulfate), furosemide, glipizide, hydralazine, lidocaine, losartan, metformin, metoprolol tartrate, mupirocin, omeprazole, oxycodone-acetaminophen, pravastatin, prazosin, and sodium bicarbonate.       Vitals:   Vitals:    02/15/24 1544 02/15/24 1600 02/15/24 1627 02/15/24 1628   BP: (!) 199/76 162/67 178/71 (!) 183/72   Pulse: 69 57 59 57   Resp: 16      Temp: 36.3 °C (97.3 °F)      SpO2: 97%      Weight: 66.7 kg (147 lb 0.8 oz)                   Fall Risk Screening: Savage Fall Risk  History of Falling, Immediate or Within 3 Months: No  Ambulatory Aid: Walks without aid/bedrest/nurse assist  Gait/Transferring: Normal/bedrest/immobile  Mental Status: Oriented to own ability          Initiated By: DEVONTE HANNA RN      Vida May had no medications administered during this visit.        _________________________________________________________________________    Note authored and patient cared for by: DEVONTE HANNA RN    Note/Encounter reviewed by: Alysha GOODE NP. This provider on site at time of patient encounter. Staff to  notify this provider of any questions, concerns, abnormals or issues during encounter. Notified of elevated BP. Pt asymptomatic in r/t BP. Is prescribed antihypertensives. Pt to f/u with PCP for elevated BP. Treatment postponed as noted above.    regular

## 2024-02-15 NOTE — LETTER
February 15, 2024     Tucker Luevano DO  [id number]  [facility] [address]    Patient: Vida May   YOB: 1945   Date of Visit: 2/15/2024       Ruby Luevano DO:    Just an FYI:    PT. HER FOR WEEKLY PROCRIT INJECTION, PT. DID NOT MEET TREATMENT PARAMETERS AEB ELEVATED BP ABOVE 160 SYSTOLIC. PT. STATES SHE DID TAKE BP MEDS TODAY. PT RESCHEDULED FOR NEXT WEEK.    .       Sincerely,     INF 03 ROZ    Alysha Sarmiento, APRN-CNP     CC: No Recipients  ______________________________________________________________________________________    [facility]   infusion Clinic Note   Date: February 15, 2024   Name: Vida May  : 1945   MRN: [id number]         Reason for Visit: Injections (PT. HER FOR WEEKLY PROCRIT INJECTION, PT. DID NOT MEET TREATMENT PARAMETERS AEB ELEVATED BP ABOVE 160 SYSTOLIC./ALYSHA SARMIENTO NP AWARE. WILL NOTIFY DR. LUEVANO./PT. AND DAUGHTER AWARE AND ACCEPTING./PT. STATES SHE DID TAKE BP MEDS TODAY.)         Visit Type: INJECTION       Ordered By: DR LUEVANO      Accompanied by:Relative      Diagnosis: Anemia, unspecified type       Allergies:   Allergies as of 02/15/2024   • (No Known Allergies)         Current Medications has a current medication list which includes the following prescription(s): allopurinol, amlodipine, aspirin, atorvastatin, cinacalcet, clonidine, cyclobenzaprine, epoetin abhinav, epoetin abhinav-epbx, ergocalciferol, ferrous sulfate (325 mg ferrous sulfate), furosemide, glipizide, hydralazine, lidocaine, losartan, metformin, metoprolol tartrate, mupirocin, omeprazole, oxycodone-acetaminophen, pravastatin, prazosin, and sodium bicarbonate.       Vitals:   Vitals:    02/15/24 1544 02/15/24 1600 02/15/24 1627 02/15/24 1628   BP: (!) 199/76 162/67 178/71 (!) 183/72   Pulse: 69 57 59 57   Resp: 16      Temp: 36.3 °C (97.3 °F)      SpO2: 97%      Weight: 66.7 kg (147 lb 0.8 oz)                   Fall Risk  Screening: Savage Fall Risk  History of Falling, Immediate or Within 3 Months: No  Ambulatory Aid: Walks without aid/bedrest/nurse assist  Gait/Transferring: Normal/bedrest/immobile  Mental Status: Oriented to own ability          Initiated By: DEVONTE HANNA RN      Vida May had no medications administered during this visit.        _________________________________________________________________________    Note authored and patient cared for by: DEVONTE HANNA RN    Note/Encounter reviewed by: Alysha GOODE NP. This provider on site at time of patient encounter. Staff to notify this provider of any questions, concerns, abnormals or issues during encounter. Notified of elevated BP. Pt asymptomatic in r/t BP. Is prescribed antihypertensives. Pt to f/u with PCP for elevated BP. Treatment postponed as noted above.

## 2024-02-15 NOTE — PATIENT INSTRUCTIONS
Today :Vida May had no medications administered during this visit.     For:   1. Anemia, unspecified type         Your next appointment is due in:  NEXT WEEK        Please read the  Medication Guide that was given to you and reviewed during todays visit.     (Tell all doctors including dentists that you are taking this medication)     Go to the emergency room or call 911 if:  -You have signs of allergic reaction:   -Rash, hives, itching.   -Swollen, blistered, peeling skin.   -Swelling of face, lips, mouth, tongue or throat.   -Tightness of chest, trouble breathing, swallowing or talking     Call your doctor:  - If IV / injection site gets red, warm, swollen, itchy or leaks fluid or pus.     (Leave dressing on your IV site for at least 2 hours and keep area clean and dry  - If you get sick or have symptoms of infection or are not feeling well for any reason.    (Wash your hands often, stay away from people who are sick)  - If you have side effects from your medication that do not go away or are bothersome.     (Refer to the teaching your nurse gave you for side effects to call your doctor about)    - Common side effects may include:  stuffy nose, headache, feeling tired, muscle aches, upset stomach  - Before receiving any vaccines     - Call [facility] at   If:  - You get sick, are on antibiotics, have had a recent vaccine, have surgery or dental work and your doctor wants your visit rescheduled.  - You need to cancel and reschedule your visit for any reason. Call at least 2 days before your visit if you need to cancel.   - Your insurance changes before your next visit.    (We will need to get approval from your new insurance. This can take up to two weeks.)     [facility] is opened Monday thru Friday. We are closed on weekends and holidays.   Voice mail will take your call if the center is closed. If you leave a message please allow 24 hours for a call back during  weekdays. If you leave a message on a weekend/holiday, we will call you back the next business day.

## 2024-02-22 ENCOUNTER — INFUSION (OUTPATIENT)
Dept: INFUSION THERAPY | Facility: CLINIC | Age: 79
End: 2024-02-22
Payer: MEDICARE

## 2024-02-22 VITALS
BODY MASS INDEX: 27.42 KG/M2 | TEMPERATURE: 97.3 F | HEART RATE: 62 BPM | SYSTOLIC BLOOD PRESSURE: 185 MMHG | RESPIRATION RATE: 16 BRPM | DIASTOLIC BLOOD PRESSURE: 72 MMHG | WEIGHT: 149.91 LBS | OXYGEN SATURATION: 98 %

## 2024-02-22 ASSESSMENT — ENCOUNTER SYMPTOMS
NAUSEA: 0
DIARRHEA: 0
EYES NEGATIVE: 1
NERVOUS/ANXIOUS: 0
LIGHT-HEADEDNESS: 0
EXTREMITY WEAKNESS: 0
HEADACHES: 0
EYE PROBLEMS: 0
SHORTNESS OF BREATH: 0
SLEEP DISTURBANCE: 0
COUGH: 0
WOUND: 0
HEMATOLOGIC/LYMPHATIC NEGATIVE: 1
DEPRESSION: 0
BACK PAIN: 0
FREQUENCY: 0
CHILLS: 0
SORE THROAT: 0
LEG SWELLING: 0
DYSURIA: 0
VOICE CHANGE: 0
TROUBLE SWALLOWING: 0
MYALGIAS: 0
PSYCHIATRIC NEGATIVE: 1
ENDOCRINE NEGATIVE: 1
CONSTIPATION: 0
CARDIOVASCULAR NEGATIVE: 1
ARTHRALGIAS: 0
VOMITING: 0
UNEXPECTED WEIGHT CHANGE: 0
NUMBNESS: 0
WHEEZING: 0
APPETITE CHANGE: 0
FEVER: 0
FATIGUE: 0
HEMATURIA: 0
PALPITATIONS: 0
ABDOMINAL PAIN: 0
BLOOD IN STOOL: 0
DIZZINESS: 0

## 2024-02-22 NOTE — PROGRESS NOTES
[facility]   infusion Clinic Note   Date: 2024   Name: Vida May  : 1945   MRN: [id number]         Reason for Visit: Injections (Weekly Procrit [id number] subcutaneous injection)         Visit Type: {VISIT TYPE:70779}       Ordered By: ***      Accompanied by:{ACCOMPANIED BY..:[id number]}      Diagnosis: No diagnosis found.       Allergies:   Allergies as of 2024    (No Known Allergies)         Current Medications has a current medication list which includes the following prescription(s): allopurinol, amlodipine, aspirin, atorvastatin, cinacalcet, clonidine, cyclobenzaprine, epoetin abhinav, epoetin abhinav-epbx, ergocalciferol, ferrous sulfate (325 mg ferrous sulfate), furosemide, glipizide, hydralazine, lidocaine, losartan, metformin, metoprolol tartrate, mupirocin, omeprazole, oxycodone-acetaminophen, pravastatin, prazosin, and sodium bicarbonate.       Vitals:   There were no vitals filed for this visit.          Infusion Pre-procedure Checklist:   - Allergies reviewed: {YES NO YES DEFAULT:54799}   - Medications reviewed: {YES NO YES DEFAULT:43565}       - Previous reaction to current treatment: {YES NO YES DEFAULT:75678}      Assess patient for the concerns below. Document provider notification as appropriate.  - Active or recent infection with/without current antibiotic use: {YES NO YES DEFAULT:29556}  - Recent or planned invasive dental work: {YES NO YES DEFAULT:33501}  - Recent or planned surgeries: {YES NO YES DEFAULT:33447}  - Recently received or plans to receive vaccinations: {YES NO YES DEFAULT:94312}  - Has treatment related toxicities: {YES NO YES DEFAULT:48698}  - Is pregnant:  {YES NO YES DEFAULT:12501}      Pain: {PAIN SCALE:19154}   - Is the pain different from normal: {YES NO YES DEFAULT:46795}   - Is the pain tolerable: {YES NO YES DEFAULT:60115}   - Is your Doctor aware:  {YES NO YES DEFAULT:11714}      Labs: {labs:68715}         Fall Risk Screening:  Janette Fall Risk  History of Falling, Immediate or Within 3 Months: No  Ambulatory Aid: Walks without aid/bedrest/nurse assist  Intravenous Therapy/Heparin Lock: No  Gait/Transferring: Normal/bedrest/immobile  Mental Status: Oriented to own ability       Review Of Systems:  Review of Systems - Oncology      Infusion Readiness:   - Assessment Concerns Related to Infusion: {YES OR NO - DEFAULT NO:69418}  - Provider notified: {YES NO YES DEFAULT:35642}      Document Below Only If Indicated:   New Patient Education:    {NEW PATIENT MEDICATION EDUCATION:57319}        Treatment Conditions & Drug Specific Questions:    {TREATMENT CONDITIONS & DRUG SPECIFIC QUESTIONS:69264}        Weight Based Drug Calculations:    {WEIGHT BASED DRUGS:90580}         Initiated By: Camille Hansen LPN   Time: 4:02 PM     Vida May had no medications administered during this visit.

## 2024-02-22 NOTE — PROGRESS NOTES
[facility]   infusion Clinic Note   Date: 2024   Name: Vida May  : 1945   MRN: [id number]         Reason for Visit: Injections (Weekly Procrit [id number] subcutaneous injection/ Hold Hgb > / - Hgb 9.0/ Hgb monthly//Procrit not given, Blood Pressure elevated./ See Vital Signs screen.)       Visit Type: INJECTION     Ordered By:  Tucker Pepper DO   Accompanied by: Relative      Diagnosis: No diagnosis found.      Allergies:   Allergies as of 2024    (No Known Allergies)        Current Medications has a current medication list which includes the following prescription(s): allopurinol, amlodipine, aspirin, atorvastatin, cinacalcet, clonidine, cyclobenzaprine, epoetin abhinav, epoetin abhinav-epbx, ergocalciferol, ferrous sulfate (325 mg ferrous sulfate), furosemide, glipizide, hydralazine, lidocaine, losartan, metformin, metoprolol tartrate, mupirocin, omeprazole, oxycodone-acetaminophen, pravastatin, prazosin, and sodium bicarbonate.          Vitals:  Vitals:    24 1550 24 1610 24 1630   BP: (!) 194/72 175/76 (!) 185/72   BP Location: Left arm Left arm Left arm   Patient Position: Sitting Sitting Sitting   BP Cuff Size: Adult Adult Adult   Pulse: 71 64 62   Resp: 16     Temp: 36.3 °C (97.3 °F)     TempSrc: Temporal     SpO2: 98%     Weight: 68 kg (149 lb 14.6 oz)            Infusion Pre-procedure Checklist:   Allergies reviewed: yes   Medications reviewed: yes     Previous reaction to current treatment: No      Assess patient for the concerns below. Document provider notification as appropriate.  - Active or recent infection with/without current antibiotic use: no  - Recent or planned invasive dental work: no  - Recent or planned surgeries: no  - Recently received or plans to receive vaccinations: no  - Has treatment related toxicities: no  - Is pregnant:  n/a    - Does the patient meet criteria to treat? Yes    Provider notified? No       Pain: 0    Is the pain different from normal: n/a   Is the pain tolerable: n/a   Is your Doctor aware: n/a       Labs: 2- Hgb 9.0      Fall Risk Screening: Savage Fall Risk  History of Falling, Immediate or Within 3 Months: No  Ambulatory Aid: Walks without aid/bedrest/nurse assist  Intravenous Therapy/Heparin Lock: No  Gait/Transferring: Normal/bedrest/immobile  Mental Status: Oriented to own ability       Review of Systems   Constitutional:  Negative for appetite change, chills, fatigue, fever and unexpected weight change.   HENT:  Negative.  Negative for hearing loss, mouth sores, sore throat, tinnitus, trouble swallowing and voice change.    Eyes: Negative.  Negative for eye problems.        Wears glasses   Respiratory:  Negative for cough, shortness of breath and wheezing.         Can become SOB on exertion   Cardiovascular: Negative.  Negative for chest pain, leg swelling and palpitations.   Gastrointestinal:  Negative for abdominal pain, blood in stool, constipation, diarrhea, nausea and vomiting.   Endocrine: Negative.    Genitourinary: Negative.  Negative for dysuria, frequency and hematuria.    Musculoskeletal:  Negative for arthralgias, back pain and myalgias.   Skin: Negative.  Negative for itching, rash and wound.   Neurological:  Negative for dizziness, extremity weakness, headaches, light-headedness and numbness.   Hematological: Negative.    Psychiatric/Behavioral: Negative.  Negative for depression and sleep disturbance. The patient is not nervous/anxious.          Infusion Readiness:   Assessment Concerns Related to Infusion: No  Provider notified: no      Document Below Only If Indicated:   New Patient Education:  N/A (returning patient for continuation of therapy. Ongoing education provided as needed.)       Drug Specific Questions:  Epoetin Celestino (EPOGEN. PROCRIT, RETACRIT)  IS SBP > 160 OR 30 MM/HG GREATER THAN LAST RECORDED SBP?  On medication for HTN  (PT SHOULD UNDERGO REGULAR BP  MONITORING)    PT INFORMED OF INCREASED RISKS OF MORTALITY, SERIOUS CARDIOVASCULAR REACTIONS, THEROMBOEMBOLIC REACTIONS, STROKE AND TUMOR PROGRESSION? YES    PT INFORMED TO CONTACT PROVIDER FOR NEW ONSET NEUROLOGIC S/S OR CHANGES IN SEIZURE FREQUENCY? YES    PT AWARE OF NEED TO HAVE REGULAR LAB TESTS TO MONITOR HEMOGLOBIN? YES     Weight Based Drug Calculations:  WEIGHT BASED DRUGS: NOT APPLICABLE           Initiated By: Camille Hansen LPN   Time: 4:38 PM     Vida May had no medications administered during this visit.

## 2024-02-28 ENCOUNTER — LAB (OUTPATIENT)
Dept: LAB | Facility: LAB | Age: 79
End: 2024-02-28
Payer: MEDICARE

## 2024-02-28 DIAGNOSIS — N18.5 CHRONIC KIDNEY DISEASE, STAGE 5 (MULTI): Primary | ICD-10-CM

## 2024-02-28 LAB
ALBUMIN SERPL BCP-MCNC: 3.8 G/DL (ref 3.4–5)
ANION GAP SERPL CALC-SCNC: 17 MMOL/L (ref 10–20)
BUN SERPL-MCNC: 61 MG/DL (ref 6–23)
CALCIUM SERPL-MCNC: 9.2 MG/DL (ref 8.6–10.6)
CHLORIDE SERPL-SCNC: 106 MMOL/L (ref 98–107)
CO2 SERPL-SCNC: 20 MMOL/L (ref 21–32)
CREAT SERPL-MCNC: 4.52 MG/DL (ref 0.5–1.05)
EGFRCR SERPLBLD CKD-EPI 2021: 10 ML/MIN/1.73M*2
ERYTHROCYTE [DISTWIDTH] IN BLOOD BY AUTOMATED COUNT: 15.2 % (ref 11.5–14.5)
GLUCOSE SERPL-MCNC: 70 MG/DL (ref 74–99)
HCT VFR BLD AUTO: 30.7 % (ref 36–46)
HGB BLD-MCNC: 9.3 G/DL (ref 12–16)
MCH RBC QN AUTO: 28.6 PG (ref 26–34)
MCHC RBC AUTO-ENTMCNC: 30.3 G/DL (ref 32–36)
MCV RBC AUTO: 95 FL (ref 80–100)
NRBC BLD-RTO: 0 /100 WBCS (ref 0–0)
PHOSPHATE SERPL-MCNC: 4.1 MG/DL (ref 2.5–4.9)
PLATELET # BLD AUTO: 135 X10*3/UL (ref 150–450)
POTASSIUM SERPL-SCNC: 4.8 MMOL/L (ref 3.5–5.3)
RBC # BLD AUTO: 3.25 X10*6/UL (ref 4–5.2)
SODIUM SERPL-SCNC: 138 MMOL/L (ref 136–145)
WBC # BLD AUTO: 5 X10*3/UL (ref 4.4–11.3)

## 2024-02-28 PROCEDURE — 80069 RENAL FUNCTION PANEL: CPT

## 2024-02-28 PROCEDURE — 85027 COMPLETE CBC AUTOMATED: CPT

## 2024-02-28 PROCEDURE — 36415 COLL VENOUS BLD VENIPUNCTURE: CPT

## 2024-02-29 DIAGNOSIS — N18.5 CHRONIC KIDNEY DISEASE, STAGE 5 (MULTI): Primary | ICD-10-CM

## 2024-02-29 DIAGNOSIS — R80.1 PERSISTENT PROTEINURIA: ICD-10-CM

## 2024-02-29 DIAGNOSIS — I12.9 HYPERTENSIVE CHRONIC KIDNEY DISEASE WITH STAGE 1 THROUGH STAGE 4 CHRONIC KIDNEY DISEASE, OR UNSPECIFIED CHRONIC KIDNEY DISEASE: ICD-10-CM

## 2024-02-29 RX ORDER — CLONIDINE HYDROCHLORIDE 0.2 MG/1
0.3 TABLET ORAL 2 TIMES DAILY
Qty: 90 TABLET | Refills: 4 | Status: SHIPPED | OUTPATIENT
Start: 2024-02-29

## 2024-02-29 RX ORDER — PRAZOSIN HYDROCHLORIDE 1 MG/1
2 CAPSULE ORAL 2 TIMES DAILY
Qty: 120 CAPSULE | Refills: 4 | Status: SHIPPED | OUTPATIENT
Start: 2024-02-29 | End: 2025-02-28

## 2024-03-01 ENCOUNTER — INFUSION (OUTPATIENT)
Dept: INFUSION THERAPY | Facility: CLINIC | Age: 79
End: 2024-03-01
Payer: MEDICARE

## 2024-03-01 VITALS
WEIGHT: 148.81 LBS | BODY MASS INDEX: 27.22 KG/M2 | OXYGEN SATURATION: 98 % | SYSTOLIC BLOOD PRESSURE: 156 MMHG | TEMPERATURE: 97.5 F | RESPIRATION RATE: 16 BRPM | HEART RATE: 59 BPM | DIASTOLIC BLOOD PRESSURE: 69 MMHG

## 2024-03-01 DIAGNOSIS — D64.9 ANEMIA, UNSPECIFIED TYPE: ICD-10-CM

## 2024-03-01 PROCEDURE — 96372 THER/PROPH/DIAG INJ SC/IM: CPT | Performed by: NURSE PRACTITIONER

## 2024-03-01 RX ORDER — DIPHENHYDRAMINE HYDROCHLORIDE 50 MG/ML
50 INJECTION INTRAMUSCULAR; INTRAVENOUS AS NEEDED
Status: CANCELLED | OUTPATIENT
Start: 2024-03-07

## 2024-03-01 RX ORDER — ALBUTEROL SULFATE 0.83 MG/ML
3 SOLUTION RESPIRATORY (INHALATION) AS NEEDED
Status: CANCELLED | OUTPATIENT
Start: 2024-03-07

## 2024-03-01 RX ORDER — FAMOTIDINE 10 MG/ML
20 INJECTION INTRAVENOUS ONCE AS NEEDED
Status: CANCELLED | OUTPATIENT
Start: 2024-03-07

## 2024-03-01 RX ORDER — EPINEPHRINE 0.3 MG/.3ML
0.3 INJECTION SUBCUTANEOUS EVERY 5 MIN PRN
Status: CANCELLED | OUTPATIENT
Start: 2024-03-07

## 2024-03-01 ASSESSMENT — ENCOUNTER SYMPTOMS
HEADACHES: 0
SHORTNESS OF BREATH: 1
NAUSEA: 0
ROS GI COMMENTS: HX OF DIABETES.
CONSTIPATION: 0
MYALGIAS: 0
LEG SWELLING: 0
WOUND: 0
VOMITING: 0
DIARRHEA: 0
DIZZINESS: 0
DYSURIA: 0
COUGH: 0
APPETITE CHANGE: 0
DEPRESSION: 0
NERVOUS/ANXIOUS: 0
EYE PROBLEMS: 0
PALPITATIONS: 0
FATIGUE: 0
NUMBNESS: 0
FEVER: 0
ARTHRALGIAS: 0

## 2024-03-01 NOTE — PROGRESS NOTES
[facility]   infusion Clinic Note   Date: 2024   Name: Vida May  : 1945   MRN: [id number]         Reason for Visit: Injections (PT. HERE FOR WEEKLY PROCRIT 10,000 UNIT SUBCUTANEOUS INJECTION./HGB = 9.3 ON 2024/BP TODAY = 156/69)         Visit Type: INJECTION       Ordered By: DR. BLAKE LUEVANO      Accompanied by:Child/Children      Diagnosis: Anemia, unspecified type       Allergies:   Allergies as of 2024    (No Known Allergies)         Current Medications has a current medication list which includes the following prescription(s): allopurinol, amlodipine, aspirin, atorvastatin, cinacalcet, clonidine, cyclobenzaprine, epoetin abhinav-epbx, ergocalciferol, ferrous sulfate (325 mg ferrous sulfate), furosemide, glipizide, hydralazine, losartan, metoprolol tartrate, omeprazole, pravastatin, prazosin, sodium bicarbonate, epoetin abhinav, lidocaine, mupirocin, and oxycodone-acetaminophen.       Vitals:   Vitals:    24 1555   BP: 156/69   Pulse: 59   Resp: 16   Temp: 36.4 °C (97.5 °F)   SpO2: 98%   Weight: 67.5 kg (148 lb 13 oz)             Infusion Pre-procedure Checklist:   - Allergies reviewed: yes   - Medications reviewed: yes, PT. STATES DR. LUEVANO INCREASED HER CLONIDINE YESTERDAY, GOING TO  AT PHARMACY NOW. WILL CLARIFY CORRECT DOSAGE.       - Previous reaction to current treatment: no      Assess patient for the concerns below. Document provider notification as appropriate.  - Active or recent infection with/without current antibiotic use: no  - Recent or planned invasive dental work: no  - Recent or planned surgeries: no  - Recently received or plans to receive vaccinations: no  - Has treatment related toxicities: no  - Is pregnant:  n/a      Pain: 0   - Is the pain different from normal: n/a   - Is the pain tolerable: n/a   - Is your Doctor aware:  n/a      Labs: LABS DRAWN ON 2024, HGB = 9.3         Fall Risk Screening: Janette Fall Risk  History  of Falling, Immediate or Within 3 Months: No  Secondary Diagnosis: No  Ambulatory Aid: Walks without aid/bedrest/nurse assist  Gait/Transferring: Normal/bedrest/immobile  Mental Status: Oriented to own ability       Review Of Systems:  Review of Systems   Constitutional:  Negative for appetite change, fatigue and fever.   HENT:   Positive for hearing loss. Negative for lump/mass.    Eyes:  Negative for eye problems.   Respiratory:  Positive for shortness of breath. Negative for cough.         HX OF CANTU. PT. DENIES CHANGE.   Cardiovascular:  Negative for chest pain, leg swelling and palpitations.        HX OF HTN, PT. STATES SHE WAS AT [facility] YESTERDAY AND BP MEDS WERE ADJUSTED, GOING TO PHARMACY TO  NOW.   Gastrointestinal:  Negative for constipation, diarrhea, nausea and vomiting.        HX OF DIABETES.   Genitourinary:  Negative for dysuria.         HX OF CKD.   Musculoskeletal:  Negative for arthralgias and myalgias.   Skin:  Negative for itching, rash and wound.   Neurological:  Negative for dizziness, headaches and numbness.   Psychiatric/Behavioral:  Negative for depression. The patient is not nervous/anxious.          Infusion Readiness:   - Assessment Concerns Related to Infusion: No  - Provider notified: n/a      Document Below Only If Indicated:   New Patient Education:    N/A (returning patient for continuation of therapy. Ongoing education provided as needed.)        Treatment Conditions & Drug Specific Questions:    Epoetin Celestino (EPOGEN. PROCRIT, RETACRIT)    TREATMENT CONDITIONS:    Unless otherwise specified on patient specific therapy plan HOLD and notify provider prior to proceeding with today's injection if:  o Hemoglobin GREATER THAN 11 g/dL (parameter set by prescribing provider)  o Increase in hemoglobin GREATER THAN 1 g/dL   o SBP GREATER THAN 160 mmHg    Lab Results   Component Value Date/Time    HGB 9.3 (L) 02/28/2024 1502    HGB 9.0 (L) 02/12/2024 1636    HGB 8.6 (L) 01/03/2024 1155     "HGB 8.7 (L) 01/03/2024 1155    HGB 8.1 (A) 10/19/2023 1631     No results found for: \"HGBCAP\"     Labs reviewed and patient meets treatment conditions? Yes    DRUG SPECIFIC QUESTIONS:  - Does the patient have uncontrolled hypertension?  No, MD AWARE OF HTN, ADJUSTED MEDS YESTERDAY PER PT.    (Use is contraindicated in patients with uncontrolled hypertension)     - Educate on the following:? Yes       - Increased risk of serious cardiovascular reactions, thromboembolic reactions, stroke and tumor progression.        - Need to undergo regular blood pressure monitoring. Adhere to prescribed anti-hypertensive regimen and follow recommended dietary restrictions.        - Need to contact their healthcare provider for new onset neurologic symptoms or change in seizure frequency.        - Need for  regular laboratory tests to monitor hemoglobin levels.          Weight Based Drug Calculations:        Initiated By: Ellyn Puentes RN   Time: 4:42 PM     We administered epoetin abhinav.      "

## 2024-03-01 NOTE — PATIENT INSTRUCTIONS
Today :We administered epoetin abhinav.     For:   1. Anemia, unspecified type         Your next appointment is due in: 1 WEEK      Please read the  Medication Guide that was given to you and reviewed during todays visit.     (Tell all doctors including dentists that you are taking this medication)     Go to the emergency room or call 911 if:  -You have signs of allergic reaction:   -Rash, hives, itching.   -Swollen, blistered, peeling skin.   -Swelling of face, lips, mouth, tongue or throat.   -Tightness of chest, trouble breathing, swallowing or talking     Call your doctor:  - If IV / injection site gets red, warm, swollen, itchy or leaks fluid or pus.     (Leave dressing on your IV site for at least 2 hours and keep area clean and dry  - If you get sick or have symptoms of infection or are not feeling well for any reason.    (Wash your hands often, stay away from people who are sick)  - If you have side effects from your medication that do not go away or are bothersome.     (Refer to the teaching your nurse gave you for side effects to call your doctor about)    - Common side effects may include:  stuffy nose, headache, feeling tired, muscle aches, upset stomach  - Before receiving any vaccines     - Call the [facility] at   If:  - You get sick, are on antibiotics, have had a recent vaccine, have surgery or dental work and your doctor wants your visit rescheduled.  - You need to cancel and reschedule your visit for any reason. Call at least 2 days before your visit if you need to cancel.   - Your insurance changes before your next visit.    (We will need to get approval from your new insurance. This can take up to two weeks.)     The [facility] is opened Monday thru Friday. We are closed on weekends and holidays.   Voice mail will take your call if the center is closed. If you leave a message please allow 24 hours for a call back during weekdays. If you leave a message on a  weekend/holiday, we will call you back the next business day.

## 2024-03-07 ENCOUNTER — INFUSION (OUTPATIENT)
Dept: INFUSION THERAPY | Facility: CLINIC | Age: 79
End: 2024-03-07
Payer: MEDICARE

## 2024-03-07 VITALS
HEART RATE: 56 BPM | TEMPERATURE: 96.6 F | SYSTOLIC BLOOD PRESSURE: 135 MMHG | RESPIRATION RATE: 16 BRPM | BODY MASS INDEX: 27.44 KG/M2 | WEIGHT: 150.02 LBS | OXYGEN SATURATION: 96 % | DIASTOLIC BLOOD PRESSURE: 65 MMHG

## 2024-03-07 DIAGNOSIS — D64.9 ANEMIA, UNSPECIFIED TYPE: ICD-10-CM

## 2024-03-07 PROCEDURE — 96372 THER/PROPH/DIAG INJ SC/IM: CPT | Performed by: NURSE PRACTITIONER

## 2024-03-07 RX ORDER — ALBUTEROL SULFATE 0.83 MG/ML
3 SOLUTION RESPIRATORY (INHALATION) AS NEEDED
Status: DISCONTINUED | OUTPATIENT
Start: 2024-03-07 | End: 2024-03-07 | Stop reason: HOSPADM

## 2024-03-07 RX ORDER — EPINEPHRINE 0.3 MG/.3ML
0.3 INJECTION SUBCUTANEOUS EVERY 5 MIN PRN
Status: CANCELLED | OUTPATIENT
Start: 2024-03-08

## 2024-03-07 RX ORDER — FAMOTIDINE 10 MG/ML
20 INJECTION INTRAVENOUS ONCE AS NEEDED
Status: CANCELLED | OUTPATIENT
Start: 2024-03-08

## 2024-03-07 RX ORDER — DIPHENHYDRAMINE HYDROCHLORIDE 50 MG/ML
50 INJECTION INTRAMUSCULAR; INTRAVENOUS AS NEEDED
Status: CANCELLED | OUTPATIENT
Start: 2024-03-08

## 2024-03-07 RX ORDER — EPINEPHRINE 0.3 MG/.3ML
0.3 INJECTION SUBCUTANEOUS EVERY 5 MIN PRN
Status: DISCONTINUED | OUTPATIENT
Start: 2024-03-07 | End: 2024-03-07 | Stop reason: HOSPADM

## 2024-03-07 RX ORDER — FAMOTIDINE 10 MG/ML
20 INJECTION INTRAVENOUS ONCE AS NEEDED
Status: DISCONTINUED | OUTPATIENT
Start: 2024-03-07 | End: 2024-03-07 | Stop reason: HOSPADM

## 2024-03-07 RX ORDER — DIPHENHYDRAMINE HYDROCHLORIDE 50 MG/ML
50 INJECTION INTRAMUSCULAR; INTRAVENOUS AS NEEDED
Status: DISCONTINUED | OUTPATIENT
Start: 2024-03-07 | End: 2024-03-07 | Stop reason: HOSPADM

## 2024-03-07 RX ORDER — ALBUTEROL SULFATE 0.83 MG/ML
3 SOLUTION RESPIRATORY (INHALATION) AS NEEDED
Status: CANCELLED | OUTPATIENT
Start: 2024-03-08

## 2024-03-07 ASSESSMENT — ENCOUNTER SYMPTOMS
APPETITE CHANGE: 0
DEPRESSION: 0
DIARRHEA: 0
DYSURIA: 0
SORE THROAT: 0
FATIGUE: 0
DIZZINESS: 0
HEADACHES: 0
CONSTIPATION: 0
ROS GI COMMENTS: HX OF DIABETES.
SHORTNESS OF BREATH: 0
NAUSEA: 0
PALPITATIONS: 0
LEG SWELLING: 0
DECREASED CONCENTRATION: 0
EYE PROBLEMS: 0
FEVER: 0
MYALGIAS: 0
TROUBLE SWALLOWING: 0
ARTHRALGIAS: 0
NUMBNESS: 0
WOUND: 0
COUGH: 0
NERVOUS/ANXIOUS: 0
VOMITING: 0
EXTREMITY WEAKNESS: 0

## 2024-03-07 NOTE — PROGRESS NOTES
[facility]   Infusion Clinic Note   Date: 2024   Name: Vida May  : 1945   MRN: [id number]         Reason for Visit: Injections (PT. HERE FOR WEEKLY PROCRIT 10,000 UNIT SUBCUTANEOUS INJECTION.)         Visit Type: INJECTION       Ordered By: ELGIN LUEVANO MD      Accompanied by: DAUGHTER      Diagnosis: Anemia, unspecified type       Allergies:   Allergies as of 2024    (No Known Allergies)         Current Medications has a current medication list which includes the following prescription(s): allopurinol, amlodipine, aspirin, atorvastatin, cinacalcet, clonidine, cyclobenzaprine, epoetin abhinav, ergocalciferol, ferrous sulfate (325 mg ferrous sulfate), furosemide, glipizide, hydralazine, lidocaine, losartan, metoprolol tartrate, omeprazole, pravastatin, prazosin, and sodium bicarbonate, and the following Facility-Administered Medications: albuterol, dextrose, diphenhydramine, epinephrine, famotidine pf, methylprednisolone sod succinate (pf), and sodium chloride.       Vitals:   Vitals:    24 1552 24 1607   BP: 167/69 135/65   Pulse: 66 56   Resp: 16    Temp: 35.9 °C (96.6 °F)    TempSrc: Temporal    SpO2: 96%    Weight: 68.1 kg (150 lb 0.4 oz)              Infusion Pre-procedure Checklist:   - Allergies reviewed: yes   - Medications reviewed: yes       - Previous reaction to current treatment: no, PT. DID SAY HER INJECTION SITE WAS PAINFUL AFTER LAST WEEK'S INJECTION IN LEFT ARM, NO BRUISING, BUMPS NOTED.      Assess patient for the concerns below. Document provider notification as appropriate.  - Active or recent infection with/without current antibiotic use: no  - Recent or planned invasive dental work: no  - Recent or planned surgeries: no  - Recently received or plans to receive vaccinations: no  - Has treatment related toxicities: no  - Is pregnant:  n/a      Pain: 0   - Is the pain different from normal: n/a   - Is the pain tolerable: n/a   - Is your  Doctor aware:  n/a      Labs: CBC DONE Q 4 WEEKS, NEXT DUE MARCH 27,2024         Fall Risk Screening: Janette Fall Risk  History of Falling, Immediate or Within 3 Months: No  Ambulatory Aid: Walks without aid/bedrest/nurse assist  Gait/Transferring: Normal/bedrest/immobile  Mental Status: Oriented to own ability       Review Of Systems:  Review of Systems   Constitutional:  Negative for appetite change, fatigue and fever.   HENT:   Negative for lump/mass, mouth sores, sore throat and trouble swallowing.    Eyes:  Negative for eye problems.   Respiratory:  Negative for cough and shortness of breath.    Cardiovascular:  Negative for chest pain, leg swelling and palpitations.        HX OF HTN.   Gastrointestinal:  Negative for constipation, diarrhea, nausea and vomiting.        HX OF DIABETES.   Genitourinary:  Negative for dysuria.         HX OF CKD.   Musculoskeletal:  Negative for arthralgias and myalgias.   Skin:  Negative for itching, rash and wound.   Neurological:  Negative for dizziness, extremity weakness, headaches and numbness.   Psychiatric/Behavioral:  Negative for decreased concentration and depression. The patient is not nervous/anxious.          Infusion Readiness:   - Assessment Concerns Related to Injection:  No, REPEAT BLOOD PRESSURE = 135/65, HR 56.  - Provider notified: no      Document Below Only If Indicated:   New Patient Education:    N/A (returning patient for continuation of therapy. Ongoing education provided as needed.)        Treatment Conditions & Drug Specific Questions:    Epoetin Celestino (EPOGEN. PROCRIT, RETACRIT)    TREATMENT CONDITIONS:    Unless otherwise specified on patient specific therapy plan HOLD and notify provider prior to proceeding with today's injection if:  o Hemoglobin GREATER THAN 11 g/dL (parameter set by prescribing provider)  o Increase in hemoglobin GREATER THAN 1 g/dL   o SBP GREATER THAN 160 mmHg    Lab Results   Component Value Date/Time    HGB 9.3 (L) 02/28/2024  "1502    HGB 9.0 (L) 02/12/2024 1636    HGB 8.6 (L) 01/03/2024 1155    HGB 8.7 (L) 01/03/2024 1155    HGB 8.1 (A) 10/19/2023 1631     No results found for: \"HGBCAP\"     Labs reviewed and patient meets treatment conditions? Yes    DRUG SPECIFIC QUESTIONS:  - Does the patient have uncontrolled hypertension?   MEDICATIONS ADJUSTED IN LAST FEW WEEKS. BP BETTER TODAY.    (Use is contraindicated in patients with uncontrolled hypertension)     - Educate on the following:? Yes       - Increased risk of serious cardiovascular reactions, thromboembolic reactions, stroke and tumor progression.        - Need to undergo regular blood pressure monitoring. Adhere to prescribed anti-hypertensive regimen and follow recommended dietary restrictions.        - Need to contact their healthcare provider for new onset neurologic symptoms or change in seizure frequency.        - Need for  regular laboratory tests to monitor hemoglobin levels.          Weight Based Drug Calculations: N/A        Initiated By: Ellyn Puentes RN   Time: 4:22 PM     We administered epoetin abhinav.      "

## 2024-03-14 ENCOUNTER — INFUSION (OUTPATIENT)
Dept: INFUSION THERAPY | Facility: CLINIC | Age: 79
End: 2024-03-14
Payer: MEDICARE

## 2024-03-14 DIAGNOSIS — D64.9 ANEMIA, UNSPECIFIED TYPE: ICD-10-CM

## 2024-03-21 ENCOUNTER — INFUSION (OUTPATIENT)
Dept: INFUSION THERAPY | Facility: CLINIC | Age: 79
End: 2024-03-21
Payer: MEDICARE

## 2024-03-21 VITALS
TEMPERATURE: 97.9 F | OXYGEN SATURATION: 99 % | DIASTOLIC BLOOD PRESSURE: 76 MMHG | BODY MASS INDEX: 26.83 KG/M2 | RESPIRATION RATE: 18 BRPM | HEART RATE: 77 BPM | WEIGHT: 146.72 LBS | SYSTOLIC BLOOD PRESSURE: 162 MMHG

## 2024-03-21 DIAGNOSIS — D64.9 ANEMIA, UNSPECIFIED TYPE: ICD-10-CM

## 2024-03-21 PROCEDURE — 96372 THER/PROPH/DIAG INJ SC/IM: CPT | Performed by: NURSE PRACTITIONER

## 2024-03-21 RX ORDER — DIPHENHYDRAMINE HYDROCHLORIDE 50 MG/ML
50 INJECTION INTRAMUSCULAR; INTRAVENOUS AS NEEDED
Status: CANCELLED | OUTPATIENT
Start: 2024-03-28

## 2024-03-21 RX ORDER — FAMOTIDINE 10 MG/ML
20 INJECTION INTRAVENOUS ONCE AS NEEDED
Status: CANCELLED | OUTPATIENT
Start: 2024-03-28

## 2024-03-21 RX ORDER — ALBUTEROL SULFATE 0.83 MG/ML
3 SOLUTION RESPIRATORY (INHALATION) AS NEEDED
Status: CANCELLED | OUTPATIENT
Start: 2024-03-28

## 2024-03-21 RX ORDER — EPINEPHRINE 0.3 MG/.3ML
0.3 INJECTION SUBCUTANEOUS EVERY 5 MIN PRN
Status: CANCELLED | OUTPATIENT
Start: 2024-03-28

## 2024-03-21 ASSESSMENT — ENCOUNTER SYMPTOMS
SORE THROAT: 0
DIZZINESS: 0
EYE PROBLEMS: 0
FEVER: 0
VOMITING: 0
NUMBNESS: 0
DECREASED CONCENTRATION: 0
HEADACHES: 0
FREQUENCY: 0
CONSTIPATION: 0
MYALGIAS: 0
FATIGUE: 0
NAUSEA: 0
DIARRHEA: 0
LEG SWELLING: 0
PALPITATIONS: 0
SHORTNESS OF BREATH: 0
DEPRESSION: 0
APPETITE CHANGE: 0
DYSURIA: 0
EXTREMITY WEAKNESS: 0
BRUISES/BLEEDS EASILY: 0
COUGH: 0
NERVOUS/ANXIOUS: 0
ARTHRALGIAS: 0
TROUBLE SWALLOWING: 0
WOUND: 0
SLEEP DISTURBANCE: 0

## 2024-03-21 NOTE — PROGRESS NOTES
[facility]   Infusion Clinic Note   Date: 2024   Name: Vida May  : 1945   MRN: [id number]         Reason for Visit: Injections (Weekly Procrit [id number] subcutaneous injection)         Visit Type: INJECTION       Ordered By: ELGIN LUEVANO MD      Accompanied by:  Jillian      Diagnosis: Anemia, unspecified type       Allergies:   Allergies as of 2024    (No Known Allergies)         Current Medications has a current medication list which includes the following prescription(s): allopurinol, amlodipine, aspirin, atorvastatin, cinacalcet, clonidine, cyclobenzaprine, epoetin abhinav, ergocalciferol, ferrous sulfate (325 mg ferrous sulfate), furosemide, glipizide, hydralazine, lidocaine, losartan, metoprolol tartrate, omeprazole, pravastatin, prazosin, and sodium bicarbonate.       Vitals:   Vitals:    24 1302 24 1320   BP: 177/71 162/76   Pulse: 86 77   Resp: 18    Temp: 36.6 °C (97.9 °F)    SpO2: 99%    Weight: 66.6 kg (146 lb 11.5 oz)              Infusion Pre-procedure Checklist:   - Allergies reviewed: yes   - Medications reviewed: yes       - Previous reaction to current treatment: no      Assess patient for the concerns below. Document provider notification as appropriate.  - Active or recent infection with/without current antibiotic use: no  - Recent or planned invasive dental work: no  - Recent or planned surgeries: no  - Recently received or plans to receive vaccinations: no  - Has treatment related toxicities: no  - Is pregnant:  n/a      Pain: 0   - Is the pain different from normal: n/a   - Is the pain tolerable: n/a   - Is your Doctor aware:  n/a               Fall Risk Screening: Janette Fall Risk  History of Falling, Immediate or Within 3 Months: No  Ambulatory Aid: Walks without aid/bedrest/nurse assist  Intravenous Therapy/Heparin Lock: No  Gait/Transferring: Normal/bedrest/immobile  Mental Status: Oriented to own ability       Review Of  Systems:  Review of Systems   Constitutional:  Negative for appetite change, fatigue and fever.   HENT:   Negative for hearing loss, sore throat, tinnitus and trouble swallowing.    Eyes:  Negative for eye problems.        Wears glasses   Respiratory:  Negative for cough and shortness of breath.    Cardiovascular:  Negative for chest pain, leg swelling and palpitations.        H/0 HTN   Gastrointestinal:  Negative for constipation, diarrhea, nausea and vomiting.        H/O of diabetes   Genitourinary:  Negative for dysuria and frequency.         H/O CKD   Musculoskeletal:  Negative for arthralgias and myalgias.   Skin:  Negative for itching, rash and wound.   Neurological:  Negative for dizziness, extremity weakness, headaches and numbness.   Hematological:  Does not bruise/bleed easily.   Psychiatric/Behavioral:  Negative for decreased concentration, depression and sleep disturbance. The patient is not nervous/anxious.          Infusion Readiness:   - Assessment Concerns Related to Injection:  no  - Provider notified: no      Document Below Only If Indicated:   New Patient Education:    N/A (returning patient for continuation of therapy. Ongoing education provided as needed.)        Treatment Conditions & Drug Specific Questions:    Epoetin Celestino (EPOGEN. PROCRIT, RETACRIT)    TREATMENT CONDITIONS:    Unless otherwise specified on patient specific therapy plan HOLD and notify provider prior to proceeding with today's injection if:  o Hemoglobin GREATER THAN 11 g/dL (parameter set by prescribing provider)  o Increase in hemoglobin GREATER THAN 1 g/dL   o SBP GREATER THAN 160 mmHg    Lab Results   Component Value Date/Time    HGB 9.3 (L) 02/28/2024 1502    HGB 9.0 (L) 02/12/2024 1636    HGB 8.6 (L) 01/03/2024 1155    HGB 8.7 (L) 01/03/2024 1155    HGB 8.1 (A) 10/19/2023 1631     Labs reviewed and patient meets treatment conditions? Yes    DRUG SPECIFIC QUESTIONS:  - Does the patient have uncontrolled hypertension?  On  medication    (Use is contraindicated in patients with uncontrolled hypertension)   - Educate on the following:? Yes       - Increased risk of serious cardiovascular reactions, thromboembolic reactions, stroke and tumor progression.        - Need to undergo regular blood pressure monitoring. Adhere to prescribed anti-hypertensive regimen and follow recommended dietary restrictions.        - Need to contact their healthcare provider for new onset neurologic symptoms or change in seizure frequency.        - Need for  regular laboratory tests to monitor hemoglobin levels.          Weight Based Drug Calculations: N/A        Initiated By: Camille Hansen LPN   Time: 1:37 PM     We administered epoetin abhinav.

## 2024-03-21 NOTE — PATIENT INSTRUCTIONS
Today :We administered epoetin abhinav. Procrit [id number] subcutaneous injection right upper arm   Hold Hgb > 11   2- Hgb 9.3   Hgb monthly    For:   1. Anemia, unspecified type       Your next appointment is due in:  1 week       Please read the  Medication Guide that was given to you and reviewed during todays visit.     (Tell all doctors including dentists that you are taking this medication)     Go to the emergency room or call 911 if:  -You have signs of allergic reaction:   -Rash, hives, itching.   -Swollen, blistered, peeling skin.   -Swelling of face, lips, mouth, tongue or throat.   -Tightness of chest, trouble breathing, swallowing or talking     Call your doctor:  - If injection site gets red, warm, swollen, itchy or leaks fluid or pus.     (Leave band aid on your injection site for at least 2 hours and keep area clean and dry  - If you get sick or have symptoms of infection or are not feeling well for any reason.    (Wash your hands often, stay away from people who are sick)  - If you have side effects from your medication that do not go away or are bothersome.     (Refer to the teaching your nurse gave you for side effects to call your doctor about)    - Common side effects may include:  stuffy nose, headache, feeling tired, muscle aches, upset stomach  - Before receiving any vaccines     - Call [facility] at   If:  - You get sick, are on antibiotics, have had a recent vaccine, have surgery or dental work and your doctor wants your visit rescheduled.  - You need to cancel and reschedule your visit for any reason. Call at least 2 days before your visit if you need to cancel.   - Your insurance changes before your next visit.    (We will need to get approval from your new insurance. This can take up to two weeks.)     [facility] is opened Monday thru Friday. We are closed on weekends and holidays.   Voice mail will take your call if the center is closed. If  you leave a message please allow 24 hours for a call back during weekdays. If you leave a message on a weekend/holiday, we will call you back the next business day.

## 2024-03-27 ENCOUNTER — LAB (OUTPATIENT)
Dept: LAB | Facility: LAB | Age: 79
End: 2024-03-27
Payer: MEDICARE

## 2024-03-27 DIAGNOSIS — I12.9 HYPERTENSIVE CHRONIC KIDNEY DISEASE WITH STAGE 1 THROUGH STAGE 4 CHRONIC KIDNEY DISEASE, OR UNSPECIFIED CHRONIC KIDNEY DISEASE: ICD-10-CM

## 2024-03-27 DIAGNOSIS — D64.9 ANEMIA, UNSPECIFIED TYPE: ICD-10-CM

## 2024-03-27 DIAGNOSIS — N18.5 CHRONIC KIDNEY DISEASE, STAGE 5 (MULTI): ICD-10-CM

## 2024-03-27 LAB
25(OH)D3 SERPL-MCNC: 89 NG/ML (ref 30–100)
ALBUMIN SERPL BCP-MCNC: 3.4 G/DL (ref 3.4–5)
ANION GAP SERPL CALC-SCNC: 18 MMOL/L (ref 10–20)
BUN SERPL-MCNC: 79 MG/DL (ref 6–23)
CALCIUM SERPL-MCNC: 9 MG/DL (ref 8.6–10.6)
CHLORIDE SERPL-SCNC: 99 MMOL/L (ref 98–107)
CO2 SERPL-SCNC: 22 MMOL/L (ref 21–32)
CREAT SERPL-MCNC: 5.14 MG/DL (ref 0.5–1.05)
EGFRCR SERPLBLD CKD-EPI 2021: 8 ML/MIN/1.73M*2
ERYTHROCYTE [DISTWIDTH] IN BLOOD BY AUTOMATED COUNT: 14.7 % (ref 11.5–14.5)
GLUCOSE SERPL-MCNC: 259 MG/DL (ref 74–99)
HCT VFR BLD AUTO: 28.8 % (ref 36–46)
HGB BLD-MCNC: 9.1 G/DL (ref 12–16)
MCH RBC QN AUTO: 29.2 PG (ref 26–34)
MCHC RBC AUTO-ENTMCNC: 31.6 G/DL (ref 32–36)
MCV RBC AUTO: 92 FL (ref 80–100)
NRBC BLD-RTO: 0 /100 WBCS (ref 0–0)
PHOSPHATE SERPL-MCNC: 4.8 MG/DL (ref 2.5–4.9)
PLATELET # BLD AUTO: 173 X10*3/UL (ref 150–450)
POTASSIUM SERPL-SCNC: 5.1 MMOL/L (ref 3.5–5.3)
PROT SERPL-MCNC: 7.2 G/DL (ref 6.4–8.2)
PTH-INTACT SERPL-MCNC: 90.4 PG/ML (ref 18.5–88)
RBC # BLD AUTO: 3.12 X10*6/UL (ref 4–5.2)
SODIUM SERPL-SCNC: 134 MMOL/L (ref 136–145)
URATE SERPL-MCNC: 6.1 MG/DL (ref 2.3–6.7)
WBC # BLD AUTO: 4.9 X10*3/UL (ref 4.4–11.3)

## 2024-03-27 PROCEDURE — 36415 COLL VENOUS BLD VENIPUNCTURE: CPT

## 2024-03-27 PROCEDURE — 80069 RENAL FUNCTION PANEL: CPT

## 2024-03-27 PROCEDURE — 84550 ASSAY OF BLOOD/URIC ACID: CPT

## 2024-03-27 PROCEDURE — 84165 PROTEIN E-PHORESIS SERUM: CPT

## 2024-03-27 PROCEDURE — 83970 ASSAY OF PARATHORMONE: CPT

## 2024-03-27 PROCEDURE — 83521 IG LIGHT CHAINS FREE EACH: CPT

## 2024-03-27 PROCEDURE — 82306 VITAMIN D 25 HYDROXY: CPT

## 2024-03-27 PROCEDURE — 84165 PROTEIN E-PHORESIS SERUM: CPT | Performed by: INTERNAL MEDICINE

## 2024-03-27 PROCEDURE — 85027 COMPLETE CBC AUTOMATED: CPT

## 2024-03-27 PROCEDURE — 84155 ASSAY OF PROTEIN SERUM: CPT

## 2024-03-28 ENCOUNTER — INFUSION (OUTPATIENT)
Dept: INFUSION THERAPY | Facility: CLINIC | Age: 79
End: 2024-03-28
Payer: MEDICARE

## 2024-03-28 VITALS
RESPIRATION RATE: 16 BRPM | HEART RATE: 64 BPM | DIASTOLIC BLOOD PRESSURE: 69 MMHG | OXYGEN SATURATION: 96 % | SYSTOLIC BLOOD PRESSURE: 152 MMHG | TEMPERATURE: 96.9 F

## 2024-03-28 DIAGNOSIS — D64.9 ANEMIA, UNSPECIFIED TYPE: ICD-10-CM

## 2024-03-28 LAB
KAPPA LC SERPL-MCNC: 16.93 MG/DL (ref 0.33–1.94)
KAPPA LC/LAMBDA SER: 1.17 {RATIO} (ref 0.26–1.65)
LAMBDA LC SERPL-MCNC: 14.42 MG/DL (ref 0.57–2.63)

## 2024-03-28 PROCEDURE — 96372 THER/PROPH/DIAG INJ SC/IM: CPT | Performed by: NURSE PRACTITIONER

## 2024-03-28 RX ORDER — DIPHENHYDRAMINE HYDROCHLORIDE 50 MG/ML
50 INJECTION INTRAMUSCULAR; INTRAVENOUS AS NEEDED
Status: CANCELLED | OUTPATIENT
Start: 2024-04-04

## 2024-03-28 RX ORDER — ALBUTEROL SULFATE 0.83 MG/ML
3 SOLUTION RESPIRATORY (INHALATION) AS NEEDED
Status: CANCELLED | OUTPATIENT
Start: 2024-04-04

## 2024-03-28 RX ORDER — FAMOTIDINE 10 MG/ML
20 INJECTION INTRAVENOUS ONCE AS NEEDED
Status: CANCELLED | OUTPATIENT
Start: 2024-04-04

## 2024-03-28 RX ORDER — EPINEPHRINE 0.3 MG/.3ML
0.3 INJECTION SUBCUTANEOUS EVERY 5 MIN PRN
Status: CANCELLED | OUTPATIENT
Start: 2024-04-04

## 2024-03-28 ASSESSMENT — ENCOUNTER SYMPTOMS
DYSURIA: 0
NAUSEA: 0
COUGH: 0
MYALGIAS: 0
NUMBNESS: 0
NERVOUS/ANXIOUS: 0
FREQUENCY: 0
APPETITE CHANGE: 0
HEADACHES: 0
VOMITING: 0
SORE THROAT: 0
FEVER: 0
SLEEP DISTURBANCE: 0
TROUBLE SWALLOWING: 0
EXTREMITY WEAKNESS: 0
ARTHRALGIAS: 0
LEG SWELLING: 0
DIARRHEA: 0
SHORTNESS OF BREATH: 0
BRUISES/BLEEDS EASILY: 0
EYE PROBLEMS: 0
DIZZINESS: 0
DEPRESSION: 0
WOUND: 0
CONSTIPATION: 0
PALPITATIONS: 0
FATIGUE: 0

## 2024-03-28 NOTE — PATIENT INSTRUCTIONS
Today :We administered epoetin abhinav.     For:   1. Anemia, unspecified type         Your next appointment is due in:  WEEKLY        Please read the  Medication Guide that was given to you and reviewed during todays visit.     (Tell all doctors including dentists that you are taking this medication)     Go to the emergency room or call 911 if:  -You have signs of allergic reaction:   -Rash, hives, itching.   -Swollen, blistered, peeling skin.   -Swelling of face, lips, mouth, tongue or throat.   -Tightness of chest, trouble breathing, swallowing or talking     Call your doctor:  - If IV / injection site gets red, warm, swollen, itchy or leaks fluid or pus.     (Leave dressing on your IV site for at least 2 hours and keep area clean and dry  - If you get sick or have symptoms of infection or are not feeling well for any reason.    (Wash your hands often, stay away from people who are sick)  - If you have side effects from your medication that do not go away or are bothersome.     (Refer to the teaching your nurse gave you for side effects to call your doctor about)    - Common side effects may include:  stuffy nose, headache, feeling tired, muscle aches, upset stomach  - Before receiving any vaccines     - Call [facility] at   If:  - You get sick, are on antibiotics, have had a recent vaccine, have surgery or dental work and your doctor wants your visit rescheduled.  - You need to cancel and reschedule your visit for any reason. Call at least 2 days before your visit if you need to cancel.   - Your insurance changes before your next visit.    (We will need to get approval from your new insurance. This can take up to two weeks.)     [facility] is opened Monday thru Friday. We are closed on weekends and holidays.   Voice mail will take your call if the center is closed. If you leave a message please allow 24 hours for a call back during weekdays. If you leave a message on a  weekend/holiday, we will call you back the next business day.

## 2024-03-30 LAB
ALBUMIN: 3.4 G/DL (ref 3.4–5)
ALPHA 1 GLOBULIN: 0.5 G/DL (ref 0.2–0.6)
ALPHA 2 GLOBULIN: 0.8 G/DL (ref 0.4–1.1)
BETA GLOBULIN: 0.9 G/DL (ref 0.5–1.2)
GAMMA GLOBULIN: 1.6 G/DL (ref 0.5–1.4)
PATH REVIEW-SERUM PROTEIN ELECTROPHORESIS: ABNORMAL
PROTEIN ELECTROPHORESIS COMMENT: ABNORMAL

## 2024-04-03 ENCOUNTER — INFUSION (OUTPATIENT)
Dept: INFUSION THERAPY | Facility: CLINIC | Age: 79
End: 2024-04-03
Payer: MEDICARE

## 2024-04-03 VITALS
TEMPERATURE: 97.5 F | SYSTOLIC BLOOD PRESSURE: 160 MMHG | OXYGEN SATURATION: 98 % | DIASTOLIC BLOOD PRESSURE: 74 MMHG | RESPIRATION RATE: 17 BRPM | HEART RATE: 76 BPM

## 2024-04-03 DIAGNOSIS — D64.9 ANEMIA, UNSPECIFIED TYPE: ICD-10-CM

## 2024-04-03 PROCEDURE — 96372 THER/PROPH/DIAG INJ SC/IM: CPT | Performed by: NURSE PRACTITIONER

## 2024-04-03 RX ORDER — ALBUTEROL SULFATE 0.83 MG/ML
3 SOLUTION RESPIRATORY (INHALATION) AS NEEDED
Status: CANCELLED | OUTPATIENT
Start: 2024-04-04

## 2024-04-03 RX ORDER — DIPHENHYDRAMINE HYDROCHLORIDE 50 MG/ML
50 INJECTION INTRAMUSCULAR; INTRAVENOUS AS NEEDED
Status: CANCELLED | OUTPATIENT
Start: 2024-04-04

## 2024-04-03 RX ORDER — FAMOTIDINE 10 MG/ML
20 INJECTION INTRAVENOUS ONCE AS NEEDED
Status: CANCELLED | OUTPATIENT
Start: 2024-04-04

## 2024-04-03 RX ORDER — EPINEPHRINE 0.3 MG/.3ML
0.3 INJECTION SUBCUTANEOUS EVERY 5 MIN PRN
Status: CANCELLED | OUTPATIENT
Start: 2024-04-04

## 2024-04-03 ASSESSMENT — ENCOUNTER SYMPTOMS
LEG SWELLING: 0
DEPRESSION: 0
DIARRHEA: 0
COUGH: 0
EYE PROBLEMS: 0
CONSTIPATION: 0
MYALGIAS: 0
ARTHRALGIAS: 0
NAUSEA: 0
SLEEP DISTURBANCE: 0
APPETITE CHANGE: 0
HEADACHES: 0
SORE THROAT: 0
FREQUENCY: 0
BRUISES/BLEEDS EASILY: 0
EXTREMITY WEAKNESS: 0
NERVOUS/ANXIOUS: 0
DIZZINESS: 0
FATIGUE: 0
FEVER: 0
WOUND: 0
TROUBLE SWALLOWING: 0
VOMITING: 0
NUMBNESS: 0
PALPITATIONS: 0
SHORTNESS OF BREATH: 1
DYSURIA: 0

## 2024-04-03 NOTE — PATIENT INSTRUCTIONS
Today :We administered epoetin abhinav.     For:   1. Anemia, unspecified type         Your next appointment is due in:  ONE WEEK        Please read the  Medication Guide that was given to you and reviewed during todays visit.     (Tell all doctors including dentists that you are taking this medication)     Go to the emergency room or call 911 if:  -You have signs of allergic reaction:   -Rash, hives, itching.   -Swollen, blistered, peeling skin.   -Swelling of face, lips, mouth, tongue or throat.   -Tightness of chest, trouble breathing, swallowing or talking     Call your doctor:  - If IV / injection site gets red, warm, swollen, itchy or leaks fluid or pus.     (Leave dressing on your IV site for at least 2 hours and keep area clean and dry  - If you get sick or have symptoms of infection or are not feeling well for any reason.    (Wash your hands often, stay away from people who are sick)  - If you have side effects from your medication that do not go away or are bothersome.     (Refer to the teaching your nurse gave you for side effects to call your doctor about)    - Common side effects may include:  stuffy nose, headache, feeling tired, muscle aches, upset stomach  - Before receiving any vaccines     - Call [facility] at   If:  - You get sick, are on antibiotics, have had a recent vaccine, have surgery or dental work and your doctor wants your visit rescheduled.  - You need to cancel and reschedule your visit for any reason. Call at least 2 days before your visit if you need to cancel.   - Your insurance changes before your next visit.    (We will need to get approval from your new insurance. This can take up to two weeks.)     [facility] is opened Monday thru Friday. We are closed on weekends and holidays.   Voice mail will take your call if the center is closed. If you leave a message please allow 24 hours for a call back during weekdays. If you leave a message on a  weekend/holiday, we will call you back the next business day.

## 2024-04-03 NOTE — PROGRESS NOTES
[facility]   Infusion Clinic Note   Date: April 3, 2024   Name: Vida May  : 1945   MRN: [id number]         Reason for Visit: Follow-up (PT HERE FOR PROCRIT 50043 UNITS INJECTION /NEXT APPT: ONE WEEK)         Visit Type: INJECTION       Ordered By: ELGIN LUEVANO MD      Accompanied by:  Jillian      Diagnosis: Anemia, unspecified type       Allergies:   Allergies as of 2024    (No Known Allergies)         Current Medications has a current medication list which includes the following prescription(s): allopurinol, amlodipine, aspirin, atorvastatin, cinacalcet, clonidine, cyclobenzaprine, epoetin abhinav, ergocalciferol, ferrous sulfate (325 mg ferrous sulfate), furosemide, glipizide, hydralazine, lidocaine, losartan, metoprolol tartrate, omeprazole, pravastatin, prazosin, and sodium bicarbonate.       Vitals:   Vitals:    24 1555   BP: 160/74   Pulse: 76   Resp: 17   Temp: 36.4 °C (97.5 °F)   SpO2: 98%             Infusion Pre-procedure Checklist:   - Allergies reviewed: yes   - Medications reviewed: yes       - Previous reaction to current treatment: no      Assess patient for the concerns below. Document provider notification as appropriate.  - Active or recent infection with/without current antibiotic use: no  - Recent or planned invasive dental work: no  - Recent or planned surgeries: no  - Recently received or plans to receive vaccinations: no  - Has treatment related toxicities: no  - Is pregnant:  n/a      Pain: 0   - Is the pain different from normal: n/a   - Is the pain tolerable: n/a   - Is your Doctor aware:  n/a               Fall Risk Screening: Janette Fall Risk  History of Falling, Immediate or Within 3 Months: No  Secondary Diagnosis: No  Ambulatory Aid: Walks without aid/bedrest/nurse assist  Intravenous Therapy/Heparin Lock: No  Gait/Transferring: Normal/bedrest/immobile  Mental Status: Oriented to own ability  Savage Fall Risk Score: 0       Review Of  Systems:  Review of Systems   Constitutional:  Negative for appetite change, fatigue and fever.   HENT:   Negative for hearing loss, sore throat, tinnitus and trouble swallowing.    Eyes:  Negative for eye problems.        Wears glasses   Respiratory:  Positive for shortness of breath. Negative for cough.         HX OF SHORTNESS OF BREATH    Cardiovascular:  Negative for chest pain, leg swelling and palpitations.        H/0 HTN, HEART FAILURE, AND PAST HEART ATTACK   Gastrointestinal:  Negative for constipation, diarrhea, nausea and vomiting.   Genitourinary:  Negative for dysuria and frequency.         H/O CKD   Musculoskeletal:  Negative for arthralgias and myalgias.   Skin:  Negative for itching, rash and wound.   Neurological:  Negative for dizziness, extremity weakness, headaches and numbness.   Hematological:  Does not bruise/bleed easily.   Psychiatric/Behavioral:  Negative for depression and sleep disturbance. The patient is not nervous/anxious.          Infusion Readiness:   - Assessment Concerns Related to Injection:  no  - Provider notified: no      Document Below Only If Indicated:   New Patient Education:    N/A (returning patient for continuation of therapy. Ongoing education provided as needed.)        Treatment Conditions & Drug Specific Questions:    Epoetin Celestino (EPOGEN. PROCRIT, RETACRIT)    TREATMENT CONDITIONS:    Unless otherwise specified on patient specific therapy plan HOLD and notify provider prior to proceeding with today's injection if:  o Hemoglobin GREATER THAN 11 g/dL (parameter set by prescribing provider)  o Increase in hemoglobin GREATER THAN 1 g/dL   o SBP GREATER THAN 160 mmHg    Lab Results   Component Value Date/Time    HGB 9.1 (L) 03/27/2024 0922    HGB 9.3 (L) 02/28/2024 1502    HGB 9.0 (L) 02/12/2024 1636    HGB 8.1 (A) 10/19/2023 1631     Labs reviewed and patient meets treatment conditions? Yes    DRUG SPECIFIC QUESTIONS:  - Does the patient have uncontrolled hypertension?   On medication    (Use is contraindicated in patients with uncontrolled hypertension)     - Educate on the following:?. Yes       - Increased risk of serious cardiovascular reactions, thromboembolic reactions, stroke and tumor progression.        - Need to undergo regular blood pressure monitoring. Adhere to prescribed anti-hypertensive regimen and follow recommended dietary restrictions.        - Need to contact their healthcare provider for new onset neurologic symptoms or change in seizure frequency.        - Need for  regular laboratory tests to monitor hemoglobin levels.          Weight Based Drug Calculations: N/A        Initiated By: Fuad Cabrera RN   Time: 4:09 PM     We administered epoetin abhinav.

## 2024-04-11 ENCOUNTER — APPOINTMENT (OUTPATIENT)
Dept: INFUSION THERAPY | Facility: CLINIC | Age: 79
End: 2024-04-11
Payer: MEDICARE

## 2024-04-18 ENCOUNTER — APPOINTMENT (OUTPATIENT)
Dept: INFUSION THERAPY | Facility: CLINIC | Age: 79
End: 2024-04-18
Payer: MEDICARE

## 2024-04-19 DIAGNOSIS — D64.9 ANEMIA, UNSPECIFIED TYPE: ICD-10-CM

## 2024-05-01 ENCOUNTER — LAB (OUTPATIENT)
Dept: LAB | Facility: LAB | Age: 79
End: 2024-05-01
Payer: MEDICARE

## 2024-05-01 DIAGNOSIS — D64.9 ANEMIA, UNSPECIFIED TYPE: ICD-10-CM

## 2024-05-01 PROCEDURE — 85027 COMPLETE CBC AUTOMATED: CPT

## 2024-05-01 PROCEDURE — 36415 COLL VENOUS BLD VENIPUNCTURE: CPT

## 2024-05-02 ENCOUNTER — INFUSION (OUTPATIENT)
Dept: INFUSION THERAPY | Facility: CLINIC | Age: 79
End: 2024-05-02
Payer: MEDICARE

## 2024-05-02 VITALS
HEART RATE: 48 BPM | DIASTOLIC BLOOD PRESSURE: 88 MMHG | SYSTOLIC BLOOD PRESSURE: 174 MMHG | TEMPERATURE: 97.5 F | RESPIRATION RATE: 16 BRPM | OXYGEN SATURATION: 97 % | WEIGHT: 150.35 LBS | BODY MASS INDEX: 27.5 KG/M2

## 2024-05-02 DIAGNOSIS — N18.5 ANEMIA DUE TO STAGE 5 CHRONIC KIDNEY DISEASE, NOT ON CHRONIC DIALYSIS (MULTI): Primary | ICD-10-CM

## 2024-05-02 DIAGNOSIS — D64.9 ANEMIA, UNSPECIFIED TYPE: ICD-10-CM

## 2024-05-02 DIAGNOSIS — D63.1 ANEMIA DUE TO STAGE 5 CHRONIC KIDNEY DISEASE, NOT ON CHRONIC DIALYSIS (MULTI): Primary | ICD-10-CM

## 2024-05-02 LAB
ERYTHROCYTE [DISTWIDTH] IN BLOOD BY AUTOMATED COUNT: 15.7 % (ref 11.5–14.5)
HCT VFR BLD AUTO: 30 % (ref 36–46)
HGB BLD-MCNC: 9 G/DL (ref 12–16)
MCH RBC QN AUTO: 29.8 PG (ref 26–34)
MCHC RBC AUTO-ENTMCNC: 30 G/DL (ref 32–36)
MCV RBC AUTO: 99 FL (ref 80–100)
NRBC BLD-RTO: 0.5 /100 WBCS (ref 0–0)
PLATELET # BLD AUTO: 123 X10*3/UL (ref 150–450)
RBC # BLD AUTO: 3.02 X10*6/UL (ref 4–5.2)
WBC # BLD AUTO: 3.9 X10*3/UL (ref 4.4–11.3)

## 2024-05-02 ASSESSMENT — ENCOUNTER SYMPTOMS
NAUSEA: 0
DYSURIA: 0
DEPRESSION: 0
FEVER: 0
APPETITE CHANGE: 0
NERVOUS/ANXIOUS: 0
LIGHT-HEADEDNESS: 0
MYALGIAS: 0
NUMBNESS: 0
COUGH: 0
FATIGUE: 1
HEADACHES: 0
SORE THROAT: 0
SHORTNESS OF BREATH: 1
TROUBLE SWALLOWING: 0
PALPITATIONS: 0
CONSTIPATION: 0
ARTHRALGIAS: 0
LEG SWELLING: 0
WOUND: 0
DIZZINESS: 0
ROS GI COMMENTS: HX OF DIABETES
EYE PROBLEMS: 0
VOMITING: 0
DIARRHEA: 0

## 2024-05-02 NOTE — PROGRESS NOTES
[facility]   Infusion Clinic Note   Date: May 2, 2024   Name: Vida May  : 1945   MRN: [id number]         Reason for Visit: Injections (PT. HERE FOR WEEKLY PROCRIT 10,000 UNITS./PT. UNABLE TO GET INJECTION R/T ELEVATED BP./PT. ALSO WITH LOW HEART RATE , PT. ASYMPTOMATIC./DAUGHTER WITH PT. WILL NOTIFY MDMaryana OF BP AND LOW PULSE RATE. MAL LAMB NP AWARE.)         Visit Type: INJECTION       Ordered By: ELGIN LUEVANO      Accompanied by: DAUGHTER      Diagnosis: Anemia, unspecified type       Allergies:   Allergies as of 2024    (No Known Allergies)         Current Medications has a current medication list which includes the following prescription(s): allopurinol, amlodipine, aspirin, atorvastatin, cinacalcet, clonidine, cyclobenzaprine, epoetin abhinav, ergocalciferol, ferrous sulfate (325 mg ferrous sulfate), furosemide, glipizide, hydralazine, lidocaine, pravastatin, prazosin, sodium bicarbonate, losartan, metoprolol tartrate, and omeprazole.       Vitals:   Vitals:    24 1548 24 1625 24 1639   BP: (!) 194/73 179/71 174/88   Pulse: 53 (!) 49 (!) 48   Resp: 16     Temp: 36.4 °C (97.5 °F)     SpO2: 97%     Weight: 68.2 kg (150 lb 5.7 oz)               Infusion Pre-procedure Checklist:   - Allergies reviewed: yes   - Medications reviewed: yes       - Previous reaction to current treatment: no      Assess patient for the concerns below. Document provider notification as appropriate.  - Active or recent infection with/without current antibiotic use: no  - Recent or planned invasive dental work: no  - Recent or planned surgeries: no  - Recently received or plans to receive vaccinations: no  - Has treatment related toxicities: no  - Is pregnant:  n/a      Pain: 0   - Is the pain different from normal: n/a   - Is the pain tolerable: n/a   - Is your Doctor aware:  n/a      Labs: LABS DONE YESTERDAY, HGB = 9.0         Fall Risk Screening: Savage Fall Risk  History of  "Falling, Immediate or Within 3 Months: No  Ambulatory Aid: Walks without aid/bedrest/nurse assist  Gait/Transferring: Normal/bedrest/immobile  Mental Status: Oriented to own ability       Review Of Systems:  Review of Systems   Constitutional:  Positive for fatigue. Negative for appetite change and fever.        PT. ADMITS TO HAVING MODERATE FATIGUE, \"SOMETIMES\".   HENT:   Negative for lump/mass, mouth sores, sore throat and trouble swallowing.    Eyes:  Negative for eye problems.   Respiratory:  Positive for shortness of breath. Negative for cough.         PT. ADMITS TO CANTU.   Cardiovascular:  Negative for chest pain, leg swelling and palpitations.        PT. WITH HX OF HTN, HEART FAILURE, PREVIOUS MI.  PT. WITH ELEVATED BP AND LOW HEART RATE TODAY, PT. DENIES S/S.   Gastrointestinal:  Negative for constipation, diarrhea, nausea and vomiting.        HX OF DIABETES   Genitourinary:  Negative for dysuria.         PT. WITH CKD AND ANEMIA. PT. STATES IT IS \"SAME\".   Musculoskeletal:  Negative for arthralgias, gait problem and myalgias.   Skin:  Negative for itching, rash and wound.   Neurological:  Negative for dizziness, gait problem, headaches, light-headedness and numbness.   Psychiatric/Behavioral:  Negative for depression. The patient is not nervous/anxious.          Infusion Readiness:   - Assessment Concerns Related to Infusion: Yes, ELEVATED BP, LOW HEART RATE.  - Provider notified: yes, MAL LAMB NP AWARE.      Document Below Only If Indicated:   New Patient Education:    N/A (returning patient for continuation of therapy. Ongoing education provided as needed.)        Treatment Conditions & Drug Specific Questions:    Epoetin Celestino (EPOGEN. PROCRIT, RETACRIT)    TREATMENT CONDITIONS:    Unless otherwise specified on patient specific therapy plan HOLD and notify provider prior to proceeding with today's injection if:  o Hemoglobin GREATER THAN 11 g/dL (parameter set by prescribing provider)  o Increase in " "hemoglobin GREATER THAN 1 g/dL   o SBP GREATER THAN 160 mmHg    Lab Results   Component Value Date/Time    HGB 9.0 (L) 05/01/2024 1539    HGB 9.1 (L) 03/27/2024 0922    HGB 9.3 (L) 02/28/2024 1502    HGB 8.1 (A) 10/19/2023 1631     No results found for: \"HGBCAP\"     Labs reviewed and patient meets treatment conditions? No    DRUG SPECIFIC QUESTIONS:  - Does the patient have uncontrolled hypertension?  Yes    (Use is contraindicated in patients with uncontrolled hypertension)     - Educate on the following:? Yes       - Increased risk of serious cardiovascular reactions, thromboembolic reactions, stroke and tumor progression.        - Need to undergo regular blood pressure monitoring. Adhere to prescribed anti-hypertensive regimen and follow recommended dietary restrictions.        - Need to contact their healthcare provider for new onset neurologic symptoms or change in seizure frequency.        - Need for  regular laboratory tests to monitor hemoglobin levels.          Weight Based Drug Calculations: N/A            Initiated By: Ellyn Puentes RN   Time: 6:39 PM     Vida May had no medications administered during this visit.      "

## 2024-05-02 NOTE — PATIENT INSTRUCTIONS
Today :Vida May had no medications administered during this visit.     For:   1. Anemia, unspecified type         Your next appointment is due in: 1 WEEK       Please read the  Medication Guide that was given to you and reviewed during todays visit.     (Tell all doctors including dentists that you are taking this medication)     Go to the emergency room or call 911 if:  -You have signs of allergic reaction:   -Rash, hives, itching.   -Swollen, blistered, peeling skin.   -Swelling of face, lips, mouth, tongue or throat.   -Tightness of chest, trouble breathing, swallowing or talking     Call your doctor:  - If IV / injection site gets red, warm, swollen, itchy or leaks fluid or pus.     (Leave dressing on your IV site for at least 2 hours and keep area clean and dry  - If you get sick or have symptoms of infection or are not feeling well for any reason.    (Wash your hands often, stay away from people who are sick)  - If you have side effects from your medication that do not go away or are bothersome.     (Refer to the teaching your nurse gave you for side effects to call your doctor about)    - Common side effects may include:  stuffy nose, headache, feeling tired, muscle aches, upset stomach  - Before receiving any vaccines     - Call [facility] at   If:  - You get sick, are on antibiotics, have had a recent vaccine, have surgery or dental work and your doctor wants your visit rescheduled.  - You need to cancel and reschedule your visit for any reason. Call at least 2 days before your visit if you need to cancel.   - Your insurance changes before your next visit.    (We will need to get approval from your new insurance. This can take up to two weeks.)     [facility] is opened Monday thru Friday. We are closed on weekends and holidays.   Voice mail will take your call if the center is closed. If you leave a message please allow 24 hours for a call back during  weekdays. If you leave a message on a weekend/holiday, we will call you back the next business day.

## 2024-05-16 ENCOUNTER — INFUSION (OUTPATIENT)
Dept: INFUSION THERAPY | Facility: CLINIC | Age: 79
End: 2024-05-16
Payer: MEDICARE

## 2024-05-16 VITALS
DIASTOLIC BLOOD PRESSURE: 71 MMHG | HEART RATE: 66 BPM | WEIGHT: 149.91 LBS | OXYGEN SATURATION: 95 % | SYSTOLIC BLOOD PRESSURE: 149 MMHG | BODY MASS INDEX: 27.42 KG/M2 | TEMPERATURE: 97.7 F | RESPIRATION RATE: 18 BRPM

## 2024-05-16 DIAGNOSIS — D64.9 ANEMIA, UNSPECIFIED TYPE: ICD-10-CM

## 2024-05-16 PROCEDURE — 96372 THER/PROPH/DIAG INJ SC/IM: CPT | Performed by: NURSE PRACTITIONER

## 2024-05-16 RX ORDER — ALBUTEROL SULFATE 0.83 MG/ML
3 SOLUTION RESPIRATORY (INHALATION) AS NEEDED
Status: CANCELLED | OUTPATIENT
Start: 2024-05-22

## 2024-05-16 RX ORDER — FAMOTIDINE 10 MG/ML
20 INJECTION INTRAVENOUS ONCE AS NEEDED
Status: CANCELLED | OUTPATIENT
Start: 2024-05-22

## 2024-05-16 RX ORDER — DIPHENHYDRAMINE HYDROCHLORIDE 50 MG/ML
50 INJECTION INTRAMUSCULAR; INTRAVENOUS AS NEEDED
Status: CANCELLED | OUTPATIENT
Start: 2024-05-22

## 2024-05-16 RX ORDER — EPINEPHRINE 0.3 MG/.3ML
0.3 INJECTION SUBCUTANEOUS EVERY 5 MIN PRN
Status: CANCELLED | OUTPATIENT
Start: 2024-05-22

## 2024-05-16 ASSESSMENT — ENCOUNTER SYMPTOMS
DIARRHEA: 0
FATIGUE: 0
TROUBLE SWALLOWING: 0
FREQUENCY: 0
SORE THROAT: 0
WHEEZING: 0
CONFUSION: 0
DIZZINESS: 0
SLEEP DISTURBANCE: 0
HEADACHES: 0
MYALGIAS: 0
NUMBNESS: 0
LIGHT-HEADEDNESS: 0
EYE PROBLEMS: 0
BRUISES/BLEEDS EASILY: 0
CONSTIPATION: 0
FEVER: 0
NAUSEA: 0
HEMATURIA: 0
PALPITATIONS: 0
ABDOMINAL PAIN: 0
NERVOUS/ANXIOUS: 0
ARTHRALGIAS: 0
WOUND: 0
EXTREMITY WEAKNESS: 0
APPETITE CHANGE: 0
CHILLS: 0
DEPRESSION: 0
DYSURIA: 0
LEG SWELLING: 0
VOMITING: 0
SHORTNESS OF BREATH: 0
COUGH: 0

## 2024-05-16 NOTE — PROGRESS NOTES
[facility]   Infusion Clinic Note   Date: May 16, 2024   Name: Vida May  : 1945   MRN: [id number]         Reason for Visit: Injections (Weekly Procrit [id number] subcutaneous injection)         Visit Type: INJECTION       Ordered By: Tucker Pepper DO      Accompanied by:Relative-Jillian      Diagnosis: Anemia, unspecified type       Allergies:   Allergies as of 2024    (No Known Allergies)         Current Medications has a current medication list which includes the following prescription(s): allopurinol, amlodipine, aspirin, atorvastatin, cinacalcet, clonidine, cyclobenzaprine, epoetin abhinav, ergocalciferol, ferrous sulfate (325 mg ferrous sulfate), furosemide, glipizide, hydralazine, lidocaine, losartan, metoprolol tartrate, omeprazole, pravastatin, prazosin, and sodium bicarbonate.       Vitals:   Vitals:    24 1310   BP: 149/71   Pulse: 66   Resp: 18   Temp: 36.5 °C (97.7 °F)   TempSrc: Temporal   SpO2: 95%   Weight: 68 kg (149 lb 14.6 oz)             Infusion Pre-procedure Checklist:   - Allergies reviewed: yes   - Medications reviewed: yes       - Previous reaction to current treatment: no      Assess patient for the concerns below. Document provider notification as appropriate.  - Active or recent infection with/without current antibiotic use: no  - Recent or planned invasive dental work: no  - Recent or planned surgeries: no  - Recently received or plans to receive vaccinations: no  - Has treatment related toxicities: no  - Is pregnant:  no      Pain: 0   - Is the pain different from normal: n/a   - Is the pain tolerable: n/a   - Is your Doctor aware:  n/a               Fall Risk Screening: Janette Fall Risk  History of Falling, Immediate or Within 3 Months: No  Ambulatory Aid: Walks without aid/bedrest/nurse assist  Intravenous Therapy/Heparin Lock: No  Gait/Transferring: Normal/bedrest/immobile  Mental Status: Oriented to own ability       Review Of  Systems:  Review of Systems   Constitutional:  Negative for appetite change, chills, fatigue and fever.   HENT:   Negative for hearing loss, mouth sores, sore throat, tinnitus and trouble swallowing.    Eyes:  Negative for eye problems.        Wears glasses   Respiratory:  Negative for cough, shortness of breath and wheezing.    Cardiovascular:  Negative for chest pain, leg swelling and palpitations.        H/O HTN, Heart Failure   Gastrointestinal:  Negative for abdominal pain, constipation, diarrhea, nausea and vomiting.        H/O Diabetes   Genitourinary:  Negative for dysuria, frequency and hematuria.         H/O CKD   Musculoskeletal:  Negative for arthralgias and myalgias.   Skin:  Negative for itching, rash and wound.   Neurological:  Negative for dizziness, extremity weakness, headaches, light-headedness and numbness.   Hematological:  Does not bruise/bleed easily.   Psychiatric/Behavioral:  Negative for confusion, depression and sleep disturbance. The patient is not nervous/anxious.          Infusion Readiness:   - Assessment Concerns Related to Infusion: No  - Provider notified: n/a      Document Below Only If Indicated:   New Patient Education:    N/A (returning patient for continuation of therapy. Ongoing education provided as needed.)        Treatment Conditions & Drug Specific Questions:    Epoetin Celestino (EPOGEN. PROCRIT, RETACRIT)    TREATMENT CONDITIONS:    Unless otherwise specified on patient specific therapy plan HOLD and notify provider prior to proceeding with today's injection if:  o Hemoglobin GREATER THAN 11 g/dL (parameter set by prescribing provider)  o Increase in hemoglobin GREATER THAN 1 g/dL   o SBP GREATER THAN 160 mmHg    Lab Results   Component Value Date/Time    HGB 9.0 (L) 05/01/2024 1539    HGB 9.1 (L) 03/27/2024 0922    HGB 9.3 (L) 02/28/2024 1502    HGB 8.1 (A) 10/19/2023 1631     Labs reviewed and patient meets treatment conditions? Yes    DRUG SPECIFIC QUESTIONS:  - Does the  patient have uncontrolled hypertension?  No, on medication    (Use is contraindicated in patients with uncontrolled hypertension)     - Educate on the following:? Yes       - Increased risk of serious cardiovascular reactions, thromboembolic reactions, stroke and tumor progression.        - Need to undergo regular blood pressure monitoring. Adhere to prescribed anti-hypertensive regimen and follow recommended dietary restrictions.        - Need to contact their healthcare provider for new onset neurologic symptoms or change in seizure frequency.        - Need for  regular laboratory tests to monitor hemoglobin levels.          Weight Based Drug Calculations:    WEIGHT BASED DRUGS: NOT APPLICABLE / FLAT DOSE          Initiated By: Camille Hansen LPN   Time: 1:23 PM     We administered epoetin abhinav.

## 2024-05-16 NOTE — PATIENT INSTRUCTIONS
Today :We administered epoetin abhinav. Procrit [id number] subcutaneous injection left upper arm  Hold Hgb >11  5-1-2024 Hgb 9.0  Hgb every 4 weeks    For:   1. Anemia, unspecified type       Your next appointment is due in:  1 week        Please read the  Medication Guide that was given to you and reviewed during todays visit.     (Tell all doctors including dentists that you are taking this medication)     Go to the emergency room or call 911 if:  -You have signs of allergic reaction:   -Rash, hives, itching.   -Swollen, blistered, peeling skin.   -Swelling of face, lips, mouth, tongue or throat.   -Tightness of chest, trouble breathing, swallowing or talking     Call your doctor:  - If injection site gets red, warm, swollen, itchy or leaks fluid or pus.     (Leave band aid on your injection site for at least 2 hours and keep area clean and dry  - If you get sick or have symptoms of infection or are not feeling well for any reason.    (Wash your hands often, stay away from people who are sick)  - If you have side effects from your medication that do not go away or are bothersome.     (Refer to the teaching your nurse gave you for side effects to call your doctor about)    - Common side effects may include:  stuffy nose, headache, feeling tired, muscle aches, upset stomach  - Before receiving any vaccines     - Call [facility] at   If:  - You get sick, are on antibiotics, have had a recent vaccine, have surgery or dental work and your doctor wants your visit rescheduled.  - You need to cancel and reschedule your visit for any reason. Call at least 2 days before your visit if you need to cancel.   - Your insurance changes before your next visit.    (We will need to get approval from your new insurance. This can take up to two weeks.)     [facility] is opened Monday thru Friday. We are closed on weekends and holidays.   Voice mail will take your call if the center is closed. If  you leave a message please allow 24 hours for a call back during weekdays. If you leave a message on a weekend/holiday, we will call you back the next business day.

## 2024-05-23 ENCOUNTER — INFUSION (OUTPATIENT)
Dept: INFUSION THERAPY | Facility: CLINIC | Age: 79
End: 2024-05-23
Payer: MEDICARE

## 2024-05-23 VITALS
HEART RATE: 57 BPM | WEIGHT: 151.24 LBS | BODY MASS INDEX: 27.66 KG/M2 | SYSTOLIC BLOOD PRESSURE: 159 MMHG | RESPIRATION RATE: 16 BRPM | DIASTOLIC BLOOD PRESSURE: 72 MMHG | OXYGEN SATURATION: 96 %

## 2024-05-23 DIAGNOSIS — D64.9 ANEMIA, UNSPECIFIED TYPE: ICD-10-CM

## 2024-05-23 PROCEDURE — 96372 THER/PROPH/DIAG INJ SC/IM: CPT | Performed by: NURSE PRACTITIONER

## 2024-05-23 RX ORDER — ALBUTEROL SULFATE 0.83 MG/ML
3 SOLUTION RESPIRATORY (INHALATION) AS NEEDED
Status: CANCELLED | OUTPATIENT
Start: 2024-05-30

## 2024-05-23 RX ORDER — DIPHENHYDRAMINE HYDROCHLORIDE 50 MG/ML
50 INJECTION INTRAMUSCULAR; INTRAVENOUS AS NEEDED
Status: CANCELLED | OUTPATIENT
Start: 2024-05-30

## 2024-05-23 RX ORDER — EPINEPHRINE 0.3 MG/.3ML
0.3 INJECTION SUBCUTANEOUS EVERY 5 MIN PRN
Status: CANCELLED | OUTPATIENT
Start: 2024-05-30

## 2024-05-23 RX ORDER — FAMOTIDINE 10 MG/ML
20 INJECTION INTRAVENOUS ONCE AS NEEDED
Status: CANCELLED | OUTPATIENT
Start: 2024-05-30

## 2024-05-23 ASSESSMENT — ENCOUNTER SYMPTOMS
MYALGIAS: 0
NERVOUS/ANXIOUS: 0
SHORTNESS OF BREATH: 0
EYE PROBLEMS: 0
DEPRESSION: 0
FEVER: 0
TROUBLE SWALLOWING: 0
DIARRHEA: 0
NAUSEA: 0
FATIGUE: 0
NUMBNESS: 0
CONSTIPATION: 0
ARTHRALGIAS: 0
LEG SWELLING: 0
COUGH: 0
APPETITE CHANGE: 0
VOMITING: 0
SORE THROAT: 0
DYSURIA: 0
WOUND: 0
PALPITATIONS: 0
DIZZINESS: 0

## 2024-05-23 NOTE — PROGRESS NOTES
[facility]   Infusion Clinic Note   Date: May 23, 2024   Name: Vida May  : 1945   MRN: [id number]         Reason for Visit: Injections (PT. HERE FOR WEEKLY PROCRIT 10,000 UNIT SUBCUTANEOUS INJECTION./HGB = 9.0 ON 2034.)         Visit Type: INJECTION       Ordered By: DR. BLAKE LUEVANO      Accompanied by: DAUGHTER      Diagnosis: Anemia, unspecified type       Allergies:   Allergies as of 2024    (No Known Allergies)         Current Medications has a current medication list which includes the following prescription(s): allopurinol, amlodipine, aspirin, atorvastatin, cinacalcet, clonidine, cyclobenzaprine, epoetin abhinav, ergocalciferol, ferrous sulfate (325 mg ferrous sulfate), furosemide, glipizide, hydralazine, lidocaine, losartan, metoprolol tartrate, omeprazole, pravastatin, prazosin, and sodium bicarbonate.       Vitals:   Vitals:    24 1602 24 1615 24 1616   BP: 165/66 164/72 159/72   Pulse: 67 58 57   Resp: 16     SpO2: 96%     Weight: 68.6 kg (151 lb 3.8 oz)               Infusion Pre-procedure Checklist:   - Allergies reviewed: yes   - Medications reviewed: yes, PT. DENIES ANY CHANGES IN MEDS IN LAST WEEK.       - Previous reaction to current treatment: no      Assess patient for the concerns below. Document provider notification as appropriate.  - Active or recent infection with/without current antibiotic use: no  - Recent or planned invasive dental work: no  - Recent or planned surgeries: no  - Recently received or plans to receive vaccinations: no  - Has treatment related toxicities: no  - Is pregnant:  n/a      Pain: 0   - Is the pain different from normal: n/a   - Is the pain tolerable: n/a   - Is your Doctor aware:  n/a      Labs: N/A         Fall Risk Screening: Janette Fall Risk  History of Falling, Immediate or Within 3 Months: No  Ambulatory Aid: Walks without aid/bedrest/nurse assist  Gait/Transferring: Normal/bedrest/immobile  Mental  "Status: Oriented to own ability       Review Of Systems:  Review of Systems   Constitutional:  Negative for appetite change, fatigue and fever.   HENT:   Negative for hearing loss, lump/mass, mouth sores, sore throat and trouble swallowing.    Eyes:  Negative for eye problems.   Respiratory:  Negative for cough and shortness of breath.    Cardiovascular:  Negative for chest pain, leg swelling and palpitations.        HX OF HTN, HEART FAILURE.   Gastrointestinal:  Negative for constipation, diarrhea, nausea and vomiting.   Genitourinary:  Negative for dysuria.         HX OF CKD WITH ANEMIA.   Musculoskeletal:  Negative for arthralgias, gait problem and myalgias.   Skin:  Negative for itching, rash and wound.   Neurological:  Negative for dizziness, gait problem and numbness.   Psychiatric/Behavioral:  Negative for depression. The patient is not nervous/anxious.          Infusion Readiness:   - Assessment Concerns Related to Infusion: YES, DISCUSSED BP WITH ELIJAH SAHA NP. INJECTION AUTHORIZED FOR TODAY.  - Provider notified:       Document Below Only If Indicated:   New Patient Education:    N/A (returning patient for continuation of therapy. Ongoing education provided as needed.)        Treatment Conditions & Drug Specific Questions:    Epoetin Celestino (EPOGEN. PROCRIT, RETACRIT)    TREATMENT CONDITIONS:    Unless otherwise specified on patient specific therapy plan HOLD and notify provider prior to proceeding with today's injection if:  o Hemoglobin GREATER THAN 11 g/dL (parameter set by prescribing provider)  o Increase in hemoglobin GREATER THAN 1 g/dL   o SBP GREATER THAN 160 mmHg    Lab Results   Component Value Date/Time    HGB 9.0 (L) 05/01/2024 1539    HGB 9.1 (L) 03/27/2024 0922    HGB 9.3 (L) 02/28/2024 1502    HGB 8.1 (A) 10/19/2023 1631     No results found for: \"HGBCAP\"     Labs reviewed and patient meets treatment conditions? Yes    DRUG SPECIFIC QUESTIONS:  - Does the patient have uncontrolled " hypertension?  No, DAUGHTER DOES STATE BP IS MUCH BETTER AT HOME. DAUGHTER WILL BRING IN HOME BP CUFF NEXT VISIT TO COMPARE.    (Use is contraindicated in patients with uncontrolled hypertension)     - Educate on the following:? Yes       - Increased risk of serious cardiovascular reactions, thromboembolic reactions, stroke and tumor progression.        - Need to undergo regular blood pressure monitoring. Adhere to prescribed anti-hypertensive regimen and follow recommended dietary restrictions.        - Need to contact their healthcare provider for new onset neurologic symptoms or change in seizure frequency.        - Need for  regular laboratory tests to monitor hemoglobin levels.          Weight Based Drug Calculations:          Initiated By: Ellyn Puentes RN   Time: 5:03 PM     We administered epoetin abhinav.

## 2024-05-30 ENCOUNTER — INFUSION (OUTPATIENT)
Dept: INFUSION THERAPY | Facility: CLINIC | Age: 79
End: 2024-05-30
Payer: MEDICARE

## 2024-05-30 VITALS
WEIGHT: 148.7 LBS | OXYGEN SATURATION: 98 % | DIASTOLIC BLOOD PRESSURE: 80 MMHG | HEART RATE: 60 BPM | TEMPERATURE: 97.4 F | SYSTOLIC BLOOD PRESSURE: 149 MMHG | BODY MASS INDEX: 27.2 KG/M2 | RESPIRATION RATE: 16 BRPM

## 2024-05-30 DIAGNOSIS — D64.9 ANEMIA, UNSPECIFIED TYPE: ICD-10-CM

## 2024-05-30 PROCEDURE — 96372 THER/PROPH/DIAG INJ SC/IM: CPT | Performed by: NURSE PRACTITIONER

## 2024-05-30 RX ORDER — ALBUTEROL SULFATE 0.83 MG/ML
3 SOLUTION RESPIRATORY (INHALATION) AS NEEDED
Status: CANCELLED | OUTPATIENT
Start: 2024-06-06

## 2024-05-30 RX ORDER — EPINEPHRINE 0.3 MG/.3ML
0.3 INJECTION SUBCUTANEOUS EVERY 5 MIN PRN
Status: CANCELLED | OUTPATIENT
Start: 2024-06-06

## 2024-05-30 RX ORDER — FAMOTIDINE 10 MG/ML
20 INJECTION INTRAVENOUS ONCE AS NEEDED
Status: CANCELLED | OUTPATIENT
Start: 2024-06-06

## 2024-05-30 RX ORDER — DIPHENHYDRAMINE HYDROCHLORIDE 50 MG/ML
50 INJECTION INTRAMUSCULAR; INTRAVENOUS AS NEEDED
Status: CANCELLED | OUTPATIENT
Start: 2024-06-06

## 2024-05-30 ASSESSMENT — ENCOUNTER SYMPTOMS
SORE THROAT: 0
DIARRHEA: 0
APPETITE CHANGE: 0
ARTHRALGIAS: 0
DIZZINESS: 0
DEPRESSION: 0
NAUSEA: 0
LEG SWELLING: 0
SHORTNESS OF BREATH: 0
NERVOUS/ANXIOUS: 0
DYSURIA: 0
WOUND: 0
EYE PROBLEMS: 0
PALPITATIONS: 0
MYALGIAS: 0
TROUBLE SWALLOWING: 0
NUMBNESS: 0
VOMITING: 0
COUGH: 0
CONSTIPATION: 0
FEVER: 0
FATIGUE: 0

## 2024-05-30 NOTE — PROGRESS NOTES
[facility]   Infusion Clinic Note   Date: May 30, 2024   Name: Vida May  : 1945   MRN: [id number]         Reason for Visit: Follow-up and Injections (PT HERE FOR WEEKLY PROCRIT 10,000 UNITS )         Visit Type: INJECTION       Ordered By: DR. BLAKE LUEVANO      Accompanied by: DAUGHTER      Diagnosis: Anemia, unspecified type       Allergies:   Allergies as of 2024    (No Known Allergies)         Current Medications has a current medication list which includes the following prescription(s): allopurinol, amlodipine, aspirin, atorvastatin, clonidine, cyclobenzaprine, epoetin abhinav, ergocalciferol, ferrous sulfate (325 mg ferrous sulfate), furosemide, glipizide, hydralazine, lidocaine, losartan, metoprolol tartrate, omeprazole, pravastatin, prazosin, sodium bicarbonate, and cinacalcet.       Vitals:   Vitals:    24 1550 24 1605   BP: (!) 194/74  Comment: nurse notified 149/80  Comment: nurse notified   Pulse: 66 60   Resp: 16 16   Temp: 36.6 °C (97.8 °F) 36.3 °C (97.4 °F)   SpO2: 98% 98%   Weight: 67.4 kg (148 lb 11.2 oz)              Infusion Pre-procedure Checklist:   - Allergies reviewed: yes   - Medications reviewed: yes, PT. DENIES ANY CHANGES IN MEDS IN LAST WEEK.       - Previous reaction to current treatment: no      Assess patient for the concerns below. Document provider notification as appropriate.  - Active or recent infection with/without current antibiotic use: no  - Recent or planned invasive dental work: no  - Recent or planned surgeries: no  - Recently received or plans to receive vaccinations: no  - Has treatment related toxicities: no  - Is pregnant:  n/a      Pain: 0   - Is the pain different from normal: n/a   - Is the pain tolerable: n/a   - Is your Doctor aware:  n/a      Labs: N/A         Fall Risk Screening: Savage Fall Risk  Secondary Diagnosis: No  Ambulatory Aid: Walks without aid/bedrest/nurse assist  Intravenous Therapy/Heparin Lock:  "No  Gait/Transferring: Normal/bedrest/immobile  Mental Status: Oriented to own ability       Review Of Systems:  Review of Systems   Constitutional:  Negative for appetite change, fatigue and fever.   HENT:   Negative for hearing loss, lump/mass, mouth sores, sore throat and trouble swallowing.    Eyes:  Negative for eye problems.   Respiratory:  Negative for cough and shortness of breath.    Cardiovascular:  Negative for chest pain, leg swelling and palpitations.        HX OF HTN, HEART FAILURE.   Gastrointestinal:  Negative for constipation, diarrhea, nausea and vomiting.   Genitourinary:  Negative for dysuria.         HX OF CKD WITH ANEMIA.   Musculoskeletal:  Negative for arthralgias, gait problem and myalgias.   Skin:  Negative for itching, rash and wound.   Neurological:  Negative for dizziness, gait problem and numbness.   Psychiatric/Behavioral:  Negative for depression. The patient is not nervous/anxious.          Infusion Readiness:   - Assessment Concerns Related to Infusion: NO  - Provider notified: NA      Document Below Only If Indicated:   New Patient Education:    N/A (returning patient for continuation of therapy. Ongoing education provided as needed.)        Treatment Conditions & Drug Specific Questions:    Epoetin Celestino (EPOGEN. PROCRIT, RETACRIT)    TREATMENT CONDITIONS:    Unless otherwise specified on patient specific therapy plan HOLD and notify provider prior to proceeding with today's injection if:  o Hemoglobin GREATER THAN 11 g/dL (parameter set by prescribing provider)  o Increase in hemoglobin GREATER THAN 1 g/dL   o SBP GREATER THAN 160 mmHg    Lab Results   Component Value Date/Time    HGB 9.0 (L) 05/01/2024 1539    HGB 9.1 (L) 03/27/2024 0922    HGB 9.3 (L) 02/28/2024 1502    HGB 8.1 (A) 10/19/2023 1631     No results found for: \"HGBCAP\"     Labs reviewed and patient meets treatment conditions? Yes    DRUG SPECIFIC QUESTIONS:  - Does the patient have uncontrolled hypertension?  No, " COMES DOWN AFTER SITTING FOR 15 MIN.    (Use is contraindicated in patients with uncontrolled hypertension)     - Educate on the following:? Yes       - Increased risk of serious cardiovascular reactions, thromboembolic reactions, stroke and tumor progression.        - Need to undergo regular blood pressure monitoring. Adhere to prescribed anti-hypertensive regimen and follow recommended dietary restrictions.        - Need to contact their healthcare provider for new onset neurologic symptoms or change in seizure frequency.        - Need for  regular laboratory tests to monitor hemoglobin levels.          Weight Based Drug Calculations:          Initiated By: Fany Solis RN   Time: 4:20 PM     We administered epoetin abhinav.

## 2024-06-04 ENCOUNTER — LAB (OUTPATIENT)
Dept: LAB | Facility: LAB | Age: 79
End: 2024-06-04
Payer: MEDICARE

## 2024-06-04 DIAGNOSIS — D63.1 ANEMIA DUE TO STAGE 5 CHRONIC KIDNEY DISEASE, NOT ON CHRONIC DIALYSIS (MULTI): ICD-10-CM

## 2024-06-04 DIAGNOSIS — N18.5 ANEMIA DUE TO STAGE 5 CHRONIC KIDNEY DISEASE, NOT ON CHRONIC DIALYSIS (MULTI): ICD-10-CM

## 2024-06-04 LAB
ERYTHROCYTE [DISTWIDTH] IN BLOOD BY AUTOMATED COUNT: 16.7 % (ref 11.5–14.5)
HCT VFR BLD AUTO: 32.7 % (ref 36–46)
HGB BLD-MCNC: 9.9 G/DL (ref 12–16)
MCH RBC QN AUTO: 29.1 PG (ref 26–34)
MCHC RBC AUTO-ENTMCNC: 30.3 G/DL (ref 32–36)
MCV RBC AUTO: 96 FL (ref 80–100)
NRBC BLD-RTO: 0 /100 WBCS (ref 0–0)
PLATELET # BLD AUTO: 133 X10*3/UL (ref 150–450)
RBC # BLD AUTO: 3.4 X10*6/UL (ref 4–5.2)
WBC # BLD AUTO: 5.9 X10*3/UL (ref 4.4–11.3)

## 2024-06-04 PROCEDURE — 85027 COMPLETE CBC AUTOMATED: CPT

## 2024-06-04 PROCEDURE — 36415 COLL VENOUS BLD VENIPUNCTURE: CPT

## 2024-06-05 ENCOUNTER — INFUSION (OUTPATIENT)
Dept: INFUSION THERAPY | Facility: CLINIC | Age: 79
End: 2024-06-05
Payer: MEDICARE

## 2024-06-05 VITALS
OXYGEN SATURATION: 97 % | DIASTOLIC BLOOD PRESSURE: 74 MMHG | HEART RATE: 75 BPM | WEIGHT: 150.13 LBS | RESPIRATION RATE: 16 BRPM | SYSTOLIC BLOOD PRESSURE: 143 MMHG | TEMPERATURE: 97.5 F | BODY MASS INDEX: 27.46 KG/M2

## 2024-06-05 DIAGNOSIS — D64.9 ANEMIA, UNSPECIFIED TYPE: ICD-10-CM

## 2024-06-05 PROCEDURE — 96372 THER/PROPH/DIAG INJ SC/IM: CPT | Performed by: NURSE PRACTITIONER

## 2024-06-05 RX ORDER — EPINEPHRINE 0.3 MG/.3ML
0.3 INJECTION SUBCUTANEOUS EVERY 5 MIN PRN
OUTPATIENT
Start: 2024-06-06

## 2024-06-05 RX ORDER — FAMOTIDINE 10 MG/ML
20 INJECTION INTRAVENOUS ONCE AS NEEDED
OUTPATIENT
Start: 2024-06-06

## 2024-06-05 RX ORDER — DIPHENHYDRAMINE HYDROCHLORIDE 50 MG/ML
50 INJECTION INTRAMUSCULAR; INTRAVENOUS AS NEEDED
OUTPATIENT
Start: 2024-06-06

## 2024-06-05 RX ORDER — ALBUTEROL SULFATE 0.83 MG/ML
3 SOLUTION RESPIRATORY (INHALATION) AS NEEDED
OUTPATIENT
Start: 2024-06-06

## 2024-06-05 ASSESSMENT — ENCOUNTER SYMPTOMS
DEPRESSION: 0
NAUSEA: 0
LEG SWELLING: 0
NERVOUS/ANXIOUS: 0
DIARRHEA: 0
SORE THROAT: 0
NUMBNESS: 0
CONFUSION: 0
HEADACHES: 0
DIZZINESS: 0
DYSURIA: 0
COUGH: 0
MYALGIAS: 0
CONSTIPATION: 0
WOUND: 0
APPETITE CHANGE: 0
CHILLS: 0
TROUBLE SWALLOWING: 0
PALPITATIONS: 0
ARTHRALGIAS: 0
VOMITING: 0
EYE PROBLEMS: 0
DIFFICULTY URINATING: 0
FEVER: 0
FATIGUE: 0
SLEEP DISTURBANCE: 0
SHORTNESS OF BREATH: 0

## 2024-06-05 NOTE — PROGRESS NOTES
[facility]   Infusion Clinic Note   Date: 2024   Name: Vida May  : 1945   MRN: [id number]         Reason for Visit: Injections (PT HERE FOR WEEKLY PROCRIT 10,000 UNIT INJECTION)         Visit Type: INJECTION       Ordered By: DR. BLAKE LUEVANO      Accompanied by: DAUGHTER      Diagnosis: Anemia, unspecified type       Allergies:   Allergies as of 2024    (No Known Allergies)         Current Medications has a current medication list which includes the following prescription(s): allopurinol, amlodipine, aspirin, atorvastatin, cinacalcet, clonidine, cyclobenzaprine, epoetin abhinav, ergocalciferol, ferrous sulfate (325 mg ferrous sulfate), furosemide, glipizide, hydralazine, lidocaine, losartan, metoprolol tartrate, omeprazole, pravastatin, prazosin, and sodium bicarbonate.       Vitals:   Vitals:    24 1505 24 1515   BP: 164/73  Comment: nurse notified 143/74   Pulse: 75    Resp: 16    Temp: 36.4 °C (97.5 °F)    SpO2: 97%    Weight: 68.1 kg (150 lb 2.1 oz)              Infusion Pre-procedure Checklist:   - Allergies reviewed: yes   - Medications reviewed: yes, PT. DENIES ANY CHANGES IN MEDS IN LAST WEEK.       - Previous reaction to current treatment: no      Assess patient for the concerns below. Document provider notification as appropriate.  - Active or recent infection with/without current antibiotic use: no  - Recent or planned invasive dental work: no  - Recent or planned surgeries: no  - Recently received or plans to receive vaccinations: no  - Has treatment related toxicities: no  - Is pregnant:  n/a      Pain: 0   - Is the pain different from normal: n/a   - Is the pain tolerable: n/a   - Is your Doctor aware:  n/a      Labs: N/A         Fall Risk Screening: Janette Fall Risk  History of Falling, Immediate or Within 3 Months: No  Secondary Diagnosis: No  Ambulatory Aid: Walks without aid/bedrest/nurse assist  Intravenous Therapy/Heparin Lock:  No  Gait/Transferring: Normal/bedrest/immobile  Mental Status: Oriented to own ability  Savage Fall Risk Score: 0       Review Of Systems:  Review of Systems   Constitutional:  Negative for appetite change, chills, fatigue and fever.   HENT:   Negative for hearing loss, lump/mass, mouth sores, sore throat, tinnitus and trouble swallowing.    Eyes:  Negative for eye problems.   Respiratory:  Negative for cough and shortness of breath.    Cardiovascular:  Negative for chest pain, leg swelling and palpitations.        HX OF HTN, HEART FAILURE.   Gastrointestinal:  Negative for constipation, diarrhea, nausea and vomiting.   Genitourinary:  Negative for bladder incontinence, difficulty urinating and dysuria.         HX OF CKD WITH ANEMIA.   Musculoskeletal:  Negative for arthralgias, gait problem and myalgias.   Skin:  Negative for itching, rash and wound.   Neurological:  Negative for dizziness, gait problem, headaches and numbness.   Psychiatric/Behavioral:  Negative for confusion, depression, sleep disturbance and suicidal ideas. The patient is not nervous/anxious.          Infusion Readiness:   - Assessment Concerns Related to Infusion: NO  - Provider notified: NA      Document Below Only If Indicated:   New Patient Education:    N/A (returning patient for continuation of therapy. Ongoing education provided as needed.)        Treatment Conditions & Drug Specific Questions:    Epoetin Celestino (EPOGEN. PROCRIT, RETACRIT)    TREATMENT CONDITIONS:    Unless otherwise specified on patient specific therapy plan HOLD and notify provider prior to proceeding with today's injection if:  o Hemoglobin GREATER THAN 11 g/dL (parameter set by prescribing provider)  o Increase in hemoglobin GREATER THAN 1 g/dL   o SBP GREATER THAN 160 mmHg    Lab Results   Component Value Date/Time    HGB 9.9 (L) 06/04/2024 1013    HGB 9.0 (L) 05/01/2024 1539    HGB 9.1 (L) 03/27/2024 0922    HGB 8.1 (A) 10/19/2023 1631     No results found for:  "\"HGBCAP\"     Labs reviewed and patient meets treatment conditions? Yes    DRUG SPECIFIC QUESTIONS:  - Does the patient have uncontrolled hypertension?  No, COMES DOWN AFTER SITTING FOR 15 MIN.    (Use is contraindicated in patients with uncontrolled hypertension)     - Educate on the following:? Yes       - Increased risk of serious cardiovascular reactions, thromboembolic reactions, stroke and tumor progression.        - Need to undergo regular blood pressure monitoring. Adhere to prescribed anti-hypertensive regimen and follow recommended dietary restrictions.        - Need to contact their healthcare provider for new onset neurologic symptoms or change in seizure frequency.        - Need for  regular laboratory tests to monitor hemoglobin levels.          Weight Based Drug Calculations:          Initiated By: Aliya Correa RN   Time: 3:28 PM     We administered epoetin abhinav.      "

## 2024-06-05 NOTE — PATIENT INSTRUCTIONS
Today :We administered epoetin abhinav.     For:   1. Anemia, unspecified type         Your next appointment is due in:  NEXT WEEK         Please read the  Medication Guide that was given to you and reviewed during todays visit.     (Tell all doctors including dentists that you are taking this medication)     Go to the emergency room or call 911 if:  -You have signs of allergic reaction:   -Rash, hives, itching.   -Swollen, blistered, peeling skin.   -Swelling of face, lips, mouth, tongue or throat.   -Tightness of chest, trouble breathing, swallowing or talking     Call your doctor:  - If IV / injection site gets red, warm, swollen, itchy or leaks fluid or pus.     (Leave dressing on your IV site for at least 2 hours and keep area clean and dry  - If you get sick or have symptoms of infection or are not feeling well for any reason.    (Wash your hands often, stay away from people who are sick)  - If you have side effects from your medication that do not go away or are bothersome.     (Refer to the teaching your nurse gave you for side effects to call your doctor about)    - Common side effects may include:  stuffy nose, headache, feeling tired, muscle aches, upset stomach  - Before receiving any vaccines     - Call [facility] at   If:  - You get sick, are on antibiotics, have had a recent vaccine, have surgery or dental work and your doctor wants your visit rescheduled.  - You need to cancel and reschedule your visit for any reason. Call at least 2 days before your visit if you need to cancel.   - Your insurance changes before your next visit.    (We will need to get approval from your new insurance. This can take up to two weeks.)     [facility] is opened Monday thru Friday. We are closed on weekends and holidays.   Voice mail will take your call if the center is closed. If you leave a message please allow 24 hours for a call back during weekdays. If you leave a message on a  weekend/holiday, we will call you back the next business day.

## 2024-06-07 ENCOUNTER — LAB (OUTPATIENT)
Dept: LAB | Facility: LAB | Age: 79
End: 2024-06-07
Payer: MEDICARE

## 2024-06-07 DIAGNOSIS — N18.5 ANEMIA DUE TO STAGE 5 CHRONIC KIDNEY DISEASE, NOT ON CHRONIC DIALYSIS (MULTI): ICD-10-CM

## 2024-06-07 DIAGNOSIS — D63.1 ANEMIA DUE TO STAGE 5 CHRONIC KIDNEY DISEASE, NOT ON CHRONIC DIALYSIS (MULTI): ICD-10-CM

## 2024-06-07 DIAGNOSIS — N18.5 CHRONIC KIDNEY DISEASE, STAGE 5 (MULTI): Primary | ICD-10-CM

## 2024-06-07 DIAGNOSIS — D64.9 ANEMIA, UNSPECIFIED TYPE: ICD-10-CM

## 2024-06-07 DIAGNOSIS — N18.5 CHRONIC KIDNEY DISEASE, STAGE 5 (MULTI): ICD-10-CM

## 2024-06-07 LAB
ALBUMIN SERPL BCP-MCNC: 3.6 G/DL (ref 3.4–5)
ANION GAP SERPL CALC-SCNC: 20 MMOL/L (ref 10–20)
BUN SERPL-MCNC: 82 MG/DL (ref 6–23)
CALCIUM SERPL-MCNC: 8.2 MG/DL (ref 8.6–10.6)
CHLORIDE SERPL-SCNC: 104 MMOL/L (ref 98–107)
CO2 SERPL-SCNC: 19 MMOL/L (ref 21–32)
CREAT SERPL-MCNC: 4.68 MG/DL (ref 0.5–1.05)
EGFRCR SERPLBLD CKD-EPI 2021: 9 ML/MIN/1.73M*2
ERYTHROCYTE [DISTWIDTH] IN BLOOD BY AUTOMATED COUNT: 16.2 % (ref 11.5–14.5)
GLUCOSE SERPL-MCNC: 154 MG/DL (ref 74–99)
HCT VFR BLD AUTO: 30.6 % (ref 36–46)
HGB BLD-MCNC: 9.7 G/DL (ref 12–16)
MCH RBC QN AUTO: 29.4 PG (ref 26–34)
MCHC RBC AUTO-ENTMCNC: 31.7 G/DL (ref 32–36)
MCV RBC AUTO: 93 FL (ref 80–100)
NRBC BLD-RTO: 0 /100 WBCS (ref 0–0)
PHOSPHATE SERPL-MCNC: 5.7 MG/DL (ref 2.5–4.9)
PLATELET # BLD AUTO: 126 X10*3/UL (ref 150–450)
POTASSIUM SERPL-SCNC: 4.1 MMOL/L (ref 3.5–5.3)
RBC # BLD AUTO: 3.3 X10*6/UL (ref 4–5.2)
SODIUM SERPL-SCNC: 139 MMOL/L (ref 136–145)
WBC # BLD AUTO: 4.2 X10*3/UL (ref 4.4–11.3)

## 2024-06-07 PROCEDURE — 85027 COMPLETE CBC AUTOMATED: CPT

## 2024-06-07 PROCEDURE — 36415 COLL VENOUS BLD VENIPUNCTURE: CPT

## 2024-06-07 PROCEDURE — 80069 RENAL FUNCTION PANEL: CPT

## 2024-06-13 ENCOUNTER — APPOINTMENT (OUTPATIENT)
Dept: INFUSION THERAPY | Facility: CLINIC | Age: 79
End: 2024-06-13
Payer: MEDICARE

## 2024-06-20 ENCOUNTER — APPOINTMENT (OUTPATIENT)
Dept: INFUSION THERAPY | Facility: CLINIC | Age: 79
End: 2024-06-20
Payer: MEDICARE

## 2024-06-20 VITALS
TEMPERATURE: 98 F | OXYGEN SATURATION: 97 % | DIASTOLIC BLOOD PRESSURE: 68 MMHG | BODY MASS INDEX: 27.42 KG/M2 | WEIGHT: 149.91 LBS | HEART RATE: 68 BPM | SYSTOLIC BLOOD PRESSURE: 138 MMHG | RESPIRATION RATE: 16 BRPM

## 2024-06-20 DIAGNOSIS — D64.9 ANEMIA, UNSPECIFIED TYPE: ICD-10-CM

## 2024-06-20 PROCEDURE — 96372 THER/PROPH/DIAG INJ SC/IM: CPT | Performed by: NURSE PRACTITIONER

## 2024-06-20 RX ORDER — EPINEPHRINE 0.3 MG/.3ML
0.3 INJECTION SUBCUTANEOUS EVERY 5 MIN PRN
OUTPATIENT
Start: 2024-06-26

## 2024-06-20 RX ORDER — DIPHENHYDRAMINE HYDROCHLORIDE 50 MG/ML
50 INJECTION INTRAMUSCULAR; INTRAVENOUS AS NEEDED
OUTPATIENT
Start: 2024-06-26

## 2024-06-20 RX ORDER — FAMOTIDINE 10 MG/ML
20 INJECTION INTRAVENOUS ONCE AS NEEDED
OUTPATIENT
Start: 2024-06-26

## 2024-06-20 RX ORDER — ALBUTEROL SULFATE 0.83 MG/ML
3 SOLUTION RESPIRATORY (INHALATION) AS NEEDED
OUTPATIENT
Start: 2024-06-26

## 2024-06-20 ASSESSMENT — ENCOUNTER SYMPTOMS
WHEEZING: 0
EXTREMITY WEAKNESS: 0
PALPITATIONS: 0
LIGHT-HEADEDNESS: 0
DIZZINESS: 0
SHORTNESS OF BREATH: 0
NUMBNESS: 0
COUGH: 0
LEG SWELLING: 0
WOUND: 0

## 2024-06-20 NOTE — PROGRESS NOTES
[facility]   Infusion Clinic Note   Date: 2024   Name: Vida May  : 1945   MRN: [id number]         Reason for Visit: Injections (Weekly Procrit [id number] subcutaneous injection)         Today: We administered epoetin abhinav.       Visit Type: INJECTION       Ordered By: Tucker Pepper MD      Accompanied by:Relative-daughter      Diagnosis: Anemia, unspecified type       Allergies:   Allergies as of 2024    (No Known Allergies)         Current Medications has a current medication list which includes the following prescription(s): allopurinol, amlodipine, aspirin, atorvastatin, cinacalcet, clonidine, cyclobenzaprine, epoetin abhinav, ferrous sulfate (325 mg ferrous sulfate), furosemide, glipizide, hydralazine, lidocaine, losartan, metoprolol tartrate, omeprazole, pravastatin, prazosin, and sodium bicarbonate.       Vitals:   Vitals:    24 1600   BP: 138/68   Pulse: 68   Resp: 16   Temp: 36.7 °C (98 °F)   TempSrc: Temporal   SpO2: 97%   Weight: 68 kg (149 lb 14.6 oz)             Infusion Pre-procedure Checklist:   - Allergies reviewed: yes   - Medications reviewed: yes       - Previous reaction to current treatment: no      Assess patient for the concerns below. Document provider notification as appropriate.  - Active or recent infection with/without current antibiotic use: no  - Recent or planned invasive dental work: no  - Recent or planned surgeries: no  - Recently received or plans to receive vaccinations: no  - Has treatment related toxicities: no  - Is pregnant:  no      Pain: 0   - Is the pain different from normal: n/a   - Is the pain tolerable: n/a   - Is your Doctor aware:  n/a               Fall Risk Screening: Janette Fall Risk  History of Falling, Immediate or Within 3 Months: No  Ambulatory Aid: Walks without aid/bedrest/nurse assist  Intravenous Therapy/Heparin Lock: No  Gait/Transferring: Normal/bedrest/immobile  Mental Status: Oriented to own ability   "     Review Of Systems:  Review of Systems   Respiratory:  Negative for cough, shortness of breath and wheezing.    Cardiovascular:  Negative for chest pain, leg swelling and palpitations.   Skin:  Negative for itching, rash and wound.   Neurological:  Negative for dizziness, extremity weakness, light-headedness and numbness.         Infusion Readiness:   - Assessment Concerns Related to Infusion: No  - Provider notified: n/a      Document Below Only If Indicated:   New Patient Education:    N/A (returning patient for continuation of therapy. Ongoing education provided as needed.)        Treatment Conditions & Drug Specific Questions:    Epoetin Celestino (EPOGEN. PROCRIT, RETACRIT)    TREATMENT CONDITIONS:    Unless otherwise specified on patient specific therapy plan HOLD and notify provider prior to proceeding with today's injection if:  o Hemoglobin GREATER THAN 11 g/dL (parameter set by prescribing provider)  o Increase in hemoglobin GREATER THAN 1 g/dL   o SBP GREATER THAN 160 mmHg    Lab Results   Component Value Date/Time    HGB 9.7 (L) 06/07/2024 1104    HGB 9.9 (L) 06/04/2024 1013    HGB 9.0 (L) 05/01/2024 1539    HGB 8.1 (A) 10/19/2023 1631     No results found for: \"HGBCAP\"     Labs reviewed and patient meets treatment conditions? Yes    DRUG SPECIFIC QUESTIONS:  - Does the patient have uncontrolled hypertension?  No, controlled with medication    (Use is contraindicated in patients with uncontrolled hypertension)     - Educate on the following:? Yes       - Increased risk of serious cardiovascular reactions, thromboembolic reactions, stroke and tumor progression.        - Need to undergo regular blood pressure monitoring. Adhere to prescribed anti-hypertensive regimen and follow recommended dietary restrictions.        - Need to contact their healthcare provider for new onset neurologic symptoms or change in seizure frequency.        - Need for  regular laboratory tests to monitor hemoglobin levels.        "   Weight Based Drug Calculations:    WEIGHT BASED DRUGS: NOT APPLICABLE / FLAT DOSE          Initiated By: Camille Hansen LPN

## 2024-06-20 NOTE — PATIENT INSTRUCTIONS
Today :We administered epoetin abhinav. Procrit [id number] subcutaneous injection right upper arm  Hold Hgb >11  6-7-2024 Hgb 9.7  Hgb every 4 weeks    For:   1. Anemia, unspecified type       Your next appointment is due in:  1 week       Please read the  Medication Guide that was given to you and reviewed during todays visit.     (Tell all doctors including dentists that you are taking this medication)     Go to the emergency room or call 911 if:  -You have signs of allergic reaction:   -Rash, hives, itching.   -Swollen, blistered, peeling skin.   -Swelling of face, lips, mouth, tongue or throat.   -Tightness of chest, trouble breathing, swallowing or talking     Call your doctor:  - If injection site gets red, warm, swollen, itchy or leaks fluid or pus.     (Leave band aid on your injection site for at least 2 hours and keep area clean and dry  - If you get sick or have symptoms of infection or are not feeling well for any reason.    (Wash your hands often, stay away from people who are sick)  - If you have side effects from your medication that do not go away or are bothersome.     (Refer to the teaching your nurse gave you for side effects to call your doctor about)    - Common side effects may include:  stuffy nose, headache, feeling tired, muscle aches, upset stomach  - Before receiving any vaccines     - Call [facility] at   If:  - You get sick, are on antibiotics, have had a recent vaccine, have surgery or dental work and your doctor wants your visit rescheduled.  - You need to cancel and reschedule your visit for any reason. Call at least 2 days before your visit if you need to cancel.   - Your insurance changes before your next visit.    (We will need to get approval from your new insurance. This can take up to two weeks.)     The [facility] is opened Monday thru Friday. We are closed on weekends and holidays.   Voice mail will take your call if the center is closed. If  you leave a message please allow 24 hours for a call back during weekdays. If you leave a message on a weekend/holiday, we will call you back the next business day.

## 2024-06-27 ENCOUNTER — APPOINTMENT (OUTPATIENT)
Dept: INFUSION THERAPY | Facility: CLINIC | Age: 79
End: 2024-06-27
Payer: MEDICARE

## 2024-06-27 VITALS
TEMPERATURE: 98 F | SYSTOLIC BLOOD PRESSURE: 161 MMHG | BODY MASS INDEX: 27.42 KG/M2 | WEIGHT: 149.91 LBS | RESPIRATION RATE: 16 BRPM | OXYGEN SATURATION: 98 % | HEART RATE: 51 BPM | DIASTOLIC BLOOD PRESSURE: 79 MMHG

## 2024-06-27 DIAGNOSIS — D64.9 ANEMIA, UNSPECIFIED TYPE: ICD-10-CM

## 2024-06-27 PROCEDURE — 96372 THER/PROPH/DIAG INJ SC/IM: CPT | Performed by: NURSE PRACTITIONER

## 2024-06-27 RX ORDER — ALBUTEROL SULFATE 0.83 MG/ML
3 SOLUTION RESPIRATORY (INHALATION) AS NEEDED
OUTPATIENT
Start: 2024-07-04

## 2024-06-27 RX ORDER — EPINEPHRINE 0.3 MG/.3ML
0.3 INJECTION SUBCUTANEOUS EVERY 5 MIN PRN
OUTPATIENT
Start: 2024-07-04

## 2024-06-27 RX ORDER — FAMOTIDINE 10 MG/ML
20 INJECTION INTRAVENOUS ONCE AS NEEDED
OUTPATIENT
Start: 2024-07-04

## 2024-06-27 RX ORDER — DIPHENHYDRAMINE HYDROCHLORIDE 50 MG/ML
50 INJECTION INTRAMUSCULAR; INTRAVENOUS AS NEEDED
OUTPATIENT
Start: 2024-07-04

## 2024-06-27 ASSESSMENT — ENCOUNTER SYMPTOMS
SHORTNESS OF BREATH: 0
LEG SWELLING: 0
LIGHT-HEADEDNESS: 0
PALPITATIONS: 0
NUMBNESS: 0
COUGH: 0
WHEEZING: 0
EXTREMITY WEAKNESS: 0
DIZZINESS: 0
WOUND: 0

## 2024-06-27 NOTE — PATIENT INSTRUCTIONS
Today :We administered epoetin abhinav. Procrit [id number] subcutaneous injection left upper arm  Hold Hgb >11  6-7-2024 Hgb 9.7  Hgb every 4 weeks    For:   1. Anemia, unspecified type       Your next appointment is due in:  1 week        Please read the  Medication Guide that was given to you and reviewed during todays visit.     (Tell all doctors including dentists that you are taking this medication)     Go to the emergency room or call 911 if:  -You have signs of allergic reaction:   -Rash, hives, itching.   -Swollen, blistered, peeling skin.   -Swelling of face, lips, mouth, tongue or throat.   -Tightness of chest, trouble breathing, swallowing or talking     Call your doctor:  - If injection site gets red, warm, swollen, itchy or leaks fluid or pus.     (Leave band aid on your injection site for at least 2 hours and keep area clean and dry  - If you get sick or have symptoms of infection or are not feeling well for any reason.    (Wash your hands often, stay away from people who are sick)  - If you have side effects from your medication that do not go away or are bothersome.     (Refer to the teaching your nurse gave you for side effects to call your doctor about)    - Common side effects may include:  stuffy nose, headache, feeling tired, muscle aches, upset stomach  - Before receiving any vaccines     - Call [facility] at   If:  - You get sick, are on antibiotics, have had a recent vaccine, have surgery or dental work and your doctor wants your visit rescheduled.  - You need to cancel and reschedule your visit for any reason. Call at least 2 days before your visit if you need to cancel.   - Your insurance changes before your next visit.    (We will need to get approval from your new insurance. This can take up to two weeks.)     [facility] is opened Monday thru Friday. We are closed on weekends and holidays.   Voice mail will take your call if the center is closed. If  you leave a message please allow 24 hours for a call back during weekdays. If you leave a message on a weekend/holiday, we will call you back the next business day.

## 2024-06-27 NOTE — PROGRESS NOTES
[facility]   Infusion Clinic Note   Date: 2024   Name: Vida May  : 1945   MRN: [id number]         Reason for Visit: Injections (Procrit [id number] subcutaneous injection)         Today: We administered epoetin abhinav.       Visit Type: INJECTION       Ordered By: Tucker Pepper DO      Accompanied by: Relative-Daughter      Diagnosis: Anemia, unspecified type       Allergies:   Allergies as of 2024   • (No Known Allergies)         Current Medications has a current medication list which includes the following prescription(s): allopurinol, amlodipine, aspirin, atorvastatin, cinacalcet, clonidine, cyclobenzaprine, epoetin abhinav, ferrous sulfate (325 mg ferrous sulfate), furosemide, glipizide, hydralazine, lidocaine, losartan, metoprolol tartrate, omeprazole, pravastatin, prazosin, and sodium bicarbonate.       Vitals:   Vitals:    24 1559   BP: 161/79   Pulse: 51   Resp: 16   Temp: 36.7 °C (98 °F)   TempSrc: Temporal   SpO2: 98%   Weight: 68 kg (149 lb 14.6 oz)             Infusion Pre-procedure Checklist:   - Allergies reviewed: yes   - Medications reviewed: yes       - Previous reaction to current treatment: no      Assess patient for the concerns below. Document provider notification as appropriate.  - Active or recent infection with/without current antibiotic use: no  - Recent or planned invasive dental work: no  - Recent or planned surgeries: no  - Recently received or plans to receive vaccinations: no  - Has treatment related toxicities: no  - Is pregnant:  no      Pain: 0   - Is the pain different from normal: n/a   - Is the pain tolerable: n/a   - Is your Doctor aware:  n/a               Fall Risk Screening: Janette Fall Risk  History of Falling, Immediate or Within 3 Months: No  Ambulatory Aid: Walks without aid/bedrest/nurse assist  Intravenous Therapy/Heparin Lock: No  Gait/Transferring: Normal/bedrest/immobile  Mental Status: Oriented to own ability        Review Of Systems:  Review of Systems   Respiratory:  Negative for cough, shortness of breath and wheezing.    Cardiovascular:  Negative for chest pain, leg swelling and palpitations.   Skin:  Negative for itching, rash and wound.   Neurological:  Negative for dizziness, extremity weakness, light-headedness and numbness.         Infusion Readiness:   - Assessment Concerns Related to Infusion: No  - Provider notified: n/a      Document Below Only If Indicated:   New Patient Education:    N/A (returning patient for continuation of therapy. Ongoing education provided as needed.)        Treatment Conditions & Drug Specific Questions:     (Unless otherwise specified on patient specific therapy plan):     TREATMENT CONDITIONS:  Unless otherwise specified on patient specific therapy plan HOLD and notify provider prior to proceeding with today's injection if:  o Hemoglobin GREATER THAN 11g/dL (parameter set by prescribing provider)  o Increase in hemoglobin GREATER THAN 1 g/dL   o SBP GREATER THAN 160 mmHg    Lab Results   Component Value Date/Time    HGB 9.7 (L) 06/07/2024 1104    HGB 9.9 (L) 06/04/2024 1013    HGB 9.0 (L) 05/01/2024 1539    HGB 8.1 (A) 10/19/2023 1631     Labs reviewed and patient meets treatment conditions? Yes    DRUG SPECIFIC QUESTIONS:  - Does the patient have uncontrolled hypertension?  No, controlled with medication    (Use is contraindicated in patients with uncontrolled hypertension)     - Educate on the following:? Yes       - Increased risk of serious cardiovascular reactions, thromboembolic reactions, stroke and tumor progression.        - Educate patient to report any edema, sudden weight gain, seizures, lack of energy, pain or swelling in the limbs.             - Need to undergo regular blood pressure monitoring. Adhere to prescribed anti-hypertensive regimen and follow recommended dietary restrictions.        - Need to contact their healthcare provider for new onset neurologic symptoms or  change in seizure frequency.        - Need for  regular laboratory tests to monitor hemoglobin levels.        REMINDERS:  Recommended Vitals/Observation:  Take blood pressure prior to preparing the drug for administration.   Observation: Patient may leave immediately following the injection.        Weight Based Drug Calculations:    WEIGHT BASED DRUGS: NOT APPLICABLE / FLAT DOSE          Initiated By: Camille Hansen LPN

## 2024-07-05 ENCOUNTER — APPOINTMENT (OUTPATIENT)
Dept: INFUSION THERAPY | Facility: CLINIC | Age: 79
End: 2024-07-05
Payer: MEDICARE

## 2024-07-12 ENCOUNTER — HOSPITAL ENCOUNTER (OUTPATIENT)
Dept: RADIOLOGY | Facility: CLINIC | Age: 79
Discharge: HOME | End: 2024-07-12
Payer: MEDICARE

## 2024-07-12 ENCOUNTER — LAB (OUTPATIENT)
Dept: LAB | Facility: LAB | Age: 79
End: 2024-07-12
Payer: MEDICARE

## 2024-07-12 ENCOUNTER — APPOINTMENT (OUTPATIENT)
Dept: INFUSION THERAPY | Facility: CLINIC | Age: 79
End: 2024-07-12
Payer: MEDICARE

## 2024-07-12 VITALS
BODY MASS INDEX: 27.4 KG/M2 | WEIGHT: 149.8 LBS | DIASTOLIC BLOOD PRESSURE: 84 MMHG | SYSTOLIC BLOOD PRESSURE: 159 MMHG | RESPIRATION RATE: 18 BRPM | HEART RATE: 61 BPM | TEMPERATURE: 97.8 F | OXYGEN SATURATION: 97 %

## 2024-07-12 VITALS — HEIGHT: 62 IN | WEIGHT: 148 LBS | BODY MASS INDEX: 27.23 KG/M2

## 2024-07-12 DIAGNOSIS — N18.5 ANEMIA DUE TO STAGE 5 CHRONIC KIDNEY DISEASE, NOT ON CHRONIC DIALYSIS (MULTI): ICD-10-CM

## 2024-07-12 DIAGNOSIS — Z12.31 ENCOUNTER FOR SCREENING MAMMOGRAM FOR MALIGNANT NEOPLASM OF BREAST: ICD-10-CM

## 2024-07-12 DIAGNOSIS — D63.1 ANEMIA DUE TO STAGE 5 CHRONIC KIDNEY DISEASE, NOT ON CHRONIC DIALYSIS (MULTI): ICD-10-CM

## 2024-07-12 DIAGNOSIS — D64.9 ANEMIA, UNSPECIFIED TYPE: ICD-10-CM

## 2024-07-12 LAB
ERYTHROCYTE [DISTWIDTH] IN BLOOD BY AUTOMATED COUNT: 15 % (ref 11.5–14.5)
HCT VFR BLD AUTO: 32.4 % (ref 36–46)
HGB BLD-MCNC: 10.8 G/DL (ref 12–16)
MCH RBC QN AUTO: 29.8 PG (ref 26–34)
MCHC RBC AUTO-ENTMCNC: 33.3 G/DL (ref 32–36)
MCV RBC AUTO: 89 FL (ref 80–100)
NRBC BLD-RTO: 0 /100 WBCS (ref 0–0)
PLATELET # BLD AUTO: 112 X10*3/UL (ref 150–450)
RBC # BLD AUTO: 3.63 X10*6/UL (ref 4–5.2)
WBC # BLD AUTO: 5.2 X10*3/UL (ref 4.4–11.3)

## 2024-07-12 PROCEDURE — 77063 BREAST TOMOSYNTHESIS BI: CPT | Performed by: RADIOLOGY

## 2024-07-12 PROCEDURE — 36415 COLL VENOUS BLD VENIPUNCTURE: CPT

## 2024-07-12 PROCEDURE — 85027 COMPLETE CBC AUTOMATED: CPT

## 2024-07-12 PROCEDURE — 77067 SCR MAMMO BI INCL CAD: CPT

## 2024-07-12 PROCEDURE — 77067 SCR MAMMO BI INCL CAD: CPT | Performed by: RADIOLOGY

## 2024-07-12 RX ORDER — DIPHENHYDRAMINE HYDROCHLORIDE 50 MG/ML
50 INJECTION INTRAMUSCULAR; INTRAVENOUS AS NEEDED
OUTPATIENT
Start: 2024-07-18

## 2024-07-12 RX ORDER — ALBUTEROL SULFATE 0.83 MG/ML
3 SOLUTION RESPIRATORY (INHALATION) AS NEEDED
OUTPATIENT
Start: 2024-07-18

## 2024-07-12 RX ORDER — EPINEPHRINE 0.3 MG/.3ML
0.3 INJECTION SUBCUTANEOUS EVERY 5 MIN PRN
OUTPATIENT
Start: 2024-07-18

## 2024-07-12 RX ORDER — FAMOTIDINE 10 MG/ML
20 INJECTION INTRAVENOUS ONCE AS NEEDED
OUTPATIENT
Start: 2024-07-18

## 2024-07-12 ASSESSMENT — ENCOUNTER SYMPTOMS
WHEEZING: 0
EXTREMITY WEAKNESS: 0
WOUND: 0
DIZZINESS: 0
PALPITATIONS: 0
LIGHT-HEADEDNESS: 0
LEG SWELLING: 0
COUGH: 0
NUMBNESS: 0
SHORTNESS OF BREATH: 0

## 2024-07-12 NOTE — PROGRESS NOTES
[facility]   Infusion Clinic Note   Date: 2024   Name: Vida May  : 1945   MRN: [id number]         Reason for Visit: Injections (Every 7 days Procrit [id number] subcutaneous injection)         Today: We administered epoetin abhinav.       Visit Type: INJECTION       Ordered By:  Tucker Pepper DO      Accompanied by:Relative-daughter      Diagnosis: Anemia, unspecified type       Allergies:   Allergies as of 2024    (No Known Allergies)         Current Medications has a current medication list which includes the following prescription(s): allopurinol, amlodipine, aspirin, atorvastatin, cinacalcet, clonidine, cyclobenzaprine, epoetin abhinav, ferrous sulfate (325 mg ferrous sulfate), furosemide, glipizide, hydralazine, lidocaine, losartan, metoprolol tartrate, omeprazole, pravastatin, prazosin, and sodium bicarbonate.       Vitals:   Vitals:    24 1527   BP: 159/84   Pulse: 61   Resp: 18   Temp: 36.6 °C (97.8 °F)   SpO2: 97%   Weight: 67.9 kg (149 lb 12.8 oz)             Infusion Pre-procedure Checklist:   - Allergies reviewed: yes   - Medications reviewed: yes       - Previous reaction to current treatment: no      Assess patient for the concerns below. Document provider notification as appropriate.  - Active or recent infection with/without current antibiotic use: no  - Recent or planned invasive dental work: no  - Recent or planned surgeries: no  - Recently received or plans to receive vaccinations: no  - Has treatment related toxicities: no  - Is pregnant:  no      Pain: 0   - Is the pain different from normal: n/a   - Is the pain tolerable: n/a   - Is your Doctor aware:  n/a               Fall Risk Screening: Janette Fall Risk  History of Falling, Immediate or Within 3 Months: No  Ambulatory Aid: Walks without aid/bedrest/nurse assist  Intravenous Therapy/Heparin Lock: No  Gait/Transferring: Normal/bedrest/immobile  Mental Status: Oriented to own ability        Review Of Systems:  Review of Systems   Respiratory:  Negative for cough, shortness of breath and wheezing.    Cardiovascular:  Negative for chest pain, leg swelling and palpitations.   Skin:  Negative for itching, rash and wound.   Neurological:  Negative for dizziness, extremity weakness, light-headedness and numbness.         Infusion Readiness:   - Assessment Concerns Related to Infusion: No  - Provider notified: n/a      Document Below Only If Indicated:   New Patient Education:    N/A (returning patient for continuation of therapy. Ongoing education provided as needed.)        Treatment Conditions & Drug Specific Questions:    Epoetin Celestino (EPOGEN. PROCRIT, RETACRIT)    TREATMENT CONDITIONS:    Unless otherwise specified on patient specific therapy plan HOLD and notify provider prior to proceeding with today's injection if:  o Hemoglobin GREATER THAN 11 g/dL (parameter set by prescribing provider)  o Increase in hemoglobin GREATER THAN 1 g/dL   o SBP GREATER THAN 160 mmHg    Lab Results   Component Value Date/Time    HGB 10.8 (L) 07/12/2024 0853    HGB 9.7 (L) 06/07/2024 1104    HGB 9.9 (L) 06/04/2024 1013    HGB 8.1 (A) 10/19/2023 1631     Labs reviewed and patient meets treatment conditions? Yes    DRUG SPECIFIC QUESTIONS:  - Does the patient have uncontrolled hypertension?  No, controlled with medication    (Use is contraindicated in patients with uncontrolled hypertension)     - Educate on the following:? Yes       - Increased risk of serious cardiovascular reactions, thromboembolic reactions, stroke and tumor progression.        - Need to undergo regular blood pressure monitoring. Adhere to prescribed anti-hypertensive regimen and follow recommended dietary restrictions.        - Need to contact their healthcare provider for new onset neurologic symptoms or change in seizure frequency.        - Need for  regular laboratory tests to monitor hemoglobin levels.          Weight Based Drug  Calculations:    WEIGHT BASED DRUGS: NOT APPLICABLE / FLAT DOSE          Initiated By: Camille Hansen LPN

## 2024-07-12 NOTE — PATIENT INSTRUCTIONS
Today :We administered epoetin abhinav. Procrit [id number] subcutaneous injection right upper arm  Hold Hgb >11  7- Hgb 10.8  Hgb every 4 weeks    For:   1. Anemia, unspecified type       Your next appointment is due in:  1 week        Please read the  Medication Guide that was given to you and reviewed during todays visit.     (Tell all doctors including dentists that you are taking this medication)     Go to the emergency room or call 911 if:  -You have signs of allergic reaction:   -Rash, hives, itching.   -Swollen, blistered, peeling skin.   -Swelling of face, lips, mouth, tongue or throat.   -Tightness of chest, trouble breathing, swallowing or talking     Call your doctor:  - If injection site gets red, warm, swollen, itchy or leaks fluid or pus.     (Leave band aid on your injection site for at least 2 hours and keep area clean and dry  - If you get sick or have symptoms of infection or are not feeling well for any reason.    (Wash your hands often, stay away from people who are sick)  - If you have side effects from your medication that do not go away or are bothersome.     (Refer to the teaching your nurse gave you for side effects to call your doctor about)    - Common side effects may include:  stuffy nose, headache, feeling tired, muscle aches, upset stomach  - Before receiving any vaccines     - Call [facility] at   If:  - You get sick, are on antibiotics, have had a recent vaccine, have surgery or dental work and your doctor wants your visit rescheduled.  - You need to cancel and reschedule your visit for any reason. Call at least 2 days before your visit if you need to cancel.   - Your insurance changes before your next visit.    (We will need to get approval from your new insurance. This can take up to two weeks.)     [facility] is opened Monday thru Friday. We are closed on weekends and holidays.   Voice mail will take your call if the center is closed.  If you leave a message please allow 24 hours for a call back during weekdays. If you leave a message on a weekend/holiday, we will call you back the next business day.

## 2024-07-19 ENCOUNTER — APPOINTMENT (OUTPATIENT)
Dept: INFUSION THERAPY | Facility: CLINIC | Age: 79
End: 2024-07-19
Payer: MEDICARE

## 2024-07-26 ENCOUNTER — APPOINTMENT (OUTPATIENT)
Dept: INFUSION THERAPY | Facility: CLINIC | Age: 79
End: 2024-07-26
Payer: MEDICARE

## 2024-07-26 VITALS
SYSTOLIC BLOOD PRESSURE: 138 MMHG | DIASTOLIC BLOOD PRESSURE: 83 MMHG | TEMPERATURE: 97.6 F | RESPIRATION RATE: 18 BRPM | BODY MASS INDEX: 27.7 KG/M2 | HEART RATE: 71 BPM | OXYGEN SATURATION: 99 % | WEIGHT: 151.46 LBS

## 2024-07-26 DIAGNOSIS — D64.9 ANEMIA, UNSPECIFIED TYPE: Primary | ICD-10-CM

## 2024-07-26 RX ORDER — FAMOTIDINE 10 MG/ML
20 INJECTION INTRAVENOUS ONCE AS NEEDED
OUTPATIENT
Start: 2024-08-02

## 2024-07-26 RX ORDER — DIPHENHYDRAMINE HYDROCHLORIDE 50 MG/ML
50 INJECTION INTRAMUSCULAR; INTRAVENOUS AS NEEDED
OUTPATIENT
Start: 2024-08-02

## 2024-07-26 RX ORDER — EPINEPHRINE 0.3 MG/.3ML
0.3 INJECTION SUBCUTANEOUS EVERY 5 MIN PRN
OUTPATIENT
Start: 2024-08-02

## 2024-07-26 RX ORDER — ALBUTEROL SULFATE 0.83 MG/ML
3 SOLUTION RESPIRATORY (INHALATION) AS NEEDED
OUTPATIENT
Start: 2024-08-02

## 2024-07-26 ASSESSMENT — ENCOUNTER SYMPTOMS
LEG SWELLING: 0
EXTREMITY WEAKNESS: 0
NUMBNESS: 0
DIZZINESS: 0
WOUND: 0
COUGH: 0
SHORTNESS OF BREATH: 0
LIGHT-HEADEDNESS: 0
WHEEZING: 0
PALPITATIONS: 0

## 2024-07-26 NOTE — PROGRESS NOTES
[facility]   Infusion Clinic Note   Date: 2024   Name: Vida May  : 1945   MRN: [id number]         Reason for Visit: Injection Follow-up (PROCRIT 10,000 UNITS EVERY 7 DAYS/ HGB EVERY 4 WEEKS/)         Today: We administered epoetin abhinav.       Visit Type: INJECTION       Ordered By:  Tucker Pepper DO      Accompanied by:Relative-daughter      Diagnosis: Anemia, unspecified type       Allergies:   Allergies as of 2024    (No Known Allergies)         Current Medications has a current medication list which includes the following prescription(s): allopurinol, amlodipine, aspirin, atorvastatin, cinacalcet, clonidine, cyclobenzaprine, epoetin abhinav, ferrous sulfate (325 mg ferrous sulfate), furosemide, glipizide, hydralazine, lidocaine, losartan, metoprolol tartrate, omeprazole, pravastatin, prazosin, and sodium bicarbonate.       Vitals:   Vitals:    24 1550 24 1556   BP: 174/70 138/83   Pulse: 71    Resp: 18    Temp: 36.4 °C (97.6 °F)    TempSrc: Temporal    SpO2: 99%    Weight: 68.7 kg (151 lb 7.3 oz)                Infusion Pre-procedure Checklist:   - Allergies reviewed: yes   - Medications reviewed: yes       - Previous reaction to current treatment: no      Assess patient for the concerns below. Document provider notification as appropriate.  - Active or recent infection with/without current antibiotic use: no  - Recent or planned invasive dental work: no  - Recent or planned surgeries: no  - Recently received or plans to receive vaccinations: no  - Has treatment related toxicities: no  - Is pregnant:  no      Pain: 0   - Is the pain different from normal: n/a   - Is the pain tolerable: n/a   - Is your Doctor aware:  n/a               Fall Risk Screening: Janette Fall Risk  History of Falling, Immediate or Within 3 Months: No  Secondary Diagnosis: Yes  Ambulatory Aid: Crutches/cane/walker  Intravenous Therapy/Heparin Lock: No  Gait/Transferring:  Weak  Mental Status: Oriented to own ability  Savage Fall Risk Score: 40       Review Of Systems:  Review of Systems   Respiratory:  Negative for cough, shortness of breath and wheezing.    Cardiovascular:  Negative for chest pain, leg swelling and palpitations.   Skin:  Negative for itching, rash and wound.   Neurological:  Negative for dizziness, extremity weakness, light-headedness and numbness.         Infusion Readiness:   - Assessment Concerns Related to Infusion: No  - Provider notified: n/a      Document Below Only If Indicated:   New Patient Education:    N/A (returning patient for continuation of therapy. Ongoing education provided as needed.)        Treatment Conditions & Drug Specific Questions:    Epoetin Celestino (EPOGEN. PROCRIT, RETACRIT)    TREATMENT CONDITIONS:    Unless otherwise specified on patient specific therapy plan HOLD and notify provider prior to proceeding with today's injection if:  o Hemoglobin GREATER THAN 11 g/dL (parameter set by prescribing provider)  o Increase in hemoglobin GREATER THAN 1 g/dL   o SBP GREATER THAN 160 mmHg    Lab Results   Component Value Date/Time    HGB 10.8 (L) 07/12/2024 0853    HGB 9.7 (L) 06/07/2024 1104    HGB 9.9 (L) 06/04/2024 1013    HGB 8.1 (A) 10/19/2023 1631     Labs reviewed and patient meets treatment conditions? Yes    DRUG SPECIFIC QUESTIONS:  - Does the patient have uncontrolled hypertension?  No, controlled with medication    (Use is contraindicated in patients with uncontrolled hypertension)     - Educate on the following:? Yes       - Increased risk of serious cardiovascular reactions, thromboembolic reactions, stroke and tumor progression.        - Need to undergo regular blood pressure monitoring. Adhere to prescribed anti-hypertensive regimen and follow recommended dietary restrictions.        - Need to contact their healthcare provider for new onset neurologic symptoms or change in seizure frequency.        - Need for  regular laboratory tests  to monitor hemoglobin levels.          Weight Based Drug Calculations:    WEIGHT BASED DRUGS: NOT APPLICABLE / FLAT DOSE          Initiated By: KENA Beckett-DEXTER

## 2024-08-02 ENCOUNTER — APPOINTMENT (OUTPATIENT)
Dept: INFUSION THERAPY | Facility: CLINIC | Age: 79
End: 2024-08-02
Payer: MEDICARE

## 2024-08-07 ENCOUNTER — LAB (OUTPATIENT)
Dept: LAB | Facility: LAB | Age: 79
End: 2024-08-07
Payer: MEDICARE

## 2024-08-07 DIAGNOSIS — I11.9 HYPERTENSIVE HEART DISEASE WITHOUT HEART FAILURE: ICD-10-CM

## 2024-08-07 DIAGNOSIS — N18.2 CHRONIC KIDNEY DISEASE, STAGE 2 (MILD): ICD-10-CM

## 2024-08-07 DIAGNOSIS — E11.9 TYPE 2 DIABETES MELLITUS WITHOUT COMPLICATIONS (MULTI): Primary | ICD-10-CM

## 2024-08-07 DIAGNOSIS — N18.5 ANEMIA DUE TO STAGE 5 CHRONIC KIDNEY DISEASE, NOT ON CHRONIC DIALYSIS (MULTI): ICD-10-CM

## 2024-08-07 DIAGNOSIS — D63.1 ANEMIA DUE TO STAGE 5 CHRONIC KIDNEY DISEASE, NOT ON CHRONIC DIALYSIS (MULTI): ICD-10-CM

## 2024-08-07 LAB
ERYTHROCYTE [DISTWIDTH] IN BLOOD BY AUTOMATED COUNT: 15.6 % (ref 11.5–14.5)
HCT VFR BLD AUTO: 31.8 % (ref 36–46)
HGB BLD-MCNC: 9.6 G/DL (ref 12–16)
MCH RBC QN AUTO: 28.7 PG (ref 26–34)
MCHC RBC AUTO-ENTMCNC: 30.2 G/DL (ref 32–36)
MCV RBC AUTO: 95 FL (ref 80–100)
NRBC BLD-RTO: 0 /100 WBCS (ref 0–0)
PLATELET # BLD AUTO: 109 X10*3/UL (ref 150–450)
RBC # BLD AUTO: 3.35 X10*6/UL (ref 4–5.2)
WBC # BLD AUTO: 4 X10*3/UL (ref 4.4–11.3)

## 2024-08-07 PROCEDURE — 85027 COMPLETE CBC AUTOMATED: CPT

## 2024-08-07 PROCEDURE — 36415 COLL VENOUS BLD VENIPUNCTURE: CPT

## 2024-08-09 ENCOUNTER — APPOINTMENT (OUTPATIENT)
Dept: INFUSION THERAPY | Facility: CLINIC | Age: 79
End: 2024-08-09
Payer: MEDICARE

## 2024-08-09 VITALS
SYSTOLIC BLOOD PRESSURE: 123 MMHG | DIASTOLIC BLOOD PRESSURE: 69 MMHG | RESPIRATION RATE: 18 BRPM | TEMPERATURE: 96.7 F | OXYGEN SATURATION: 98 % | HEART RATE: 80 BPM

## 2024-08-09 DIAGNOSIS — D64.9 ANEMIA, UNSPECIFIED TYPE: ICD-10-CM

## 2024-08-09 RX ORDER — EPINEPHRINE 0.3 MG/.3ML
0.3 INJECTION SUBCUTANEOUS EVERY 5 MIN PRN
OUTPATIENT
Start: 2024-08-16

## 2024-08-09 RX ORDER — DIPHENHYDRAMINE HYDROCHLORIDE 50 MG/ML
50 INJECTION INTRAMUSCULAR; INTRAVENOUS AS NEEDED
OUTPATIENT
Start: 2024-08-16

## 2024-08-09 RX ORDER — FAMOTIDINE 10 MG/ML
20 INJECTION INTRAVENOUS ONCE AS NEEDED
OUTPATIENT
Start: 2024-08-16

## 2024-08-09 RX ORDER — ALBUTEROL SULFATE 0.83 MG/ML
3 SOLUTION RESPIRATORY (INHALATION) AS NEEDED
OUTPATIENT
Start: 2024-08-16

## 2024-08-09 ASSESSMENT — ENCOUNTER SYMPTOMS
EXTREMITY WEAKNESS: 0
WOUND: 0
NUMBNESS: 0
WHEEZING: 0
LIGHT-HEADEDNESS: 0
LEG SWELLING: 0
SHORTNESS OF BREATH: 0
DIZZINESS: 0
PALPITATIONS: 0
COUGH: 0

## 2024-08-09 NOTE — PROGRESS NOTES
[facility]   Infusion Clinic Note   Date: 2024   Name: Vida May  : 1945   MRN: [id number]          Reason for Visit: Follow-up and Injections (PT HERE FOR PROCRIT 59353 UNIT INJECTION/NEXT APPT: 7 DAYS)         Today: We administered epoetin abhinav.       Visit Type: INJECTION       Ordered By: DR LUEVANO       Accompanied by:Child/Children       Diagnosis: Anemia, unspecified type        Allergies:   Allergies as of 2024    (No Known Allergies)          Current Medications has a current medication list which includes the following prescription(s): allopurinol, amlodipine, aspirin, atorvastatin, cinacalcet, clonidine, cyclobenzaprine, epoetin abhinav, ferrous sulfate (325 mg ferrous sulfate), furosemide, glipizide, hydralazine, lidocaine, losartan, metoprolol tartrate, omeprazole, pravastatin, prazosin, and sodium bicarbonate.       Vitals:   Vitals:    24 1559 24 1612   BP: 162/73 123/69   Pulse: 80    Resp: 18    Temp: 35.9 °C (96.7 °F)    SpO2: 98%              Infusion Pre-procedure Checklist:   - Allergies reviewed: yes   - Medications reviewed: yes       - Previous reaction to current treatment: no      Assess patient for the concerns below. Document provider notification as appropriate.  - Active or recent infection with/without current antibiotic use: no  - Recent or planned invasive dental work: no  - Recent or planned surgeries: no  - Recently received or plans to receive vaccinations: no  - Has treatment related toxicities: no  - Is pregnant:  n/a      Pain: 0   - Is the pain different from normal: n/a   - Is the pain tolerable: n/a   - Is your Doctor aware:  n/a       Labs: N/A          Fall Risk Screening: Janette Fall Risk  History of Falling, Immediate or Within 3 Months: No  Secondary Diagnosis: Yes  Ambulatory Aid: Crutches/cane/walker  Intravenous Therapy/Heparin Lock: No  Gait/Transferring: Normal/bedrest/immobile  Mental Status: Oriented  "to own ability  Savage Fall Risk Score: 30       Review Of Systems:  Review of Systems   Respiratory:  Negative for cough, shortness of breath and wheezing.    Cardiovascular:  Negative for chest pain, leg swelling and palpitations.   Skin:  Negative for itching, rash and wound.   Neurological:  Negative for dizziness, extremity weakness, light-headedness and numbness.         ROS completed? Yes      Infusion Readiness:  - Assessment Concerns Related to Infusion: No  - Provider notified: n/a      Document Below Only If Indicated:   New Patient Education:    N/A (returning patient for continuation of therapy. Ongoing education provided as needed.)        Treatment Conditions & Drug Specific Questions:    Epoetin Celestino (EPOGEN. PROCRIT, RETACRIT)    TREATMENT CONDITIONS:    Unless otherwise specified on patient specific therapy plan HOLD and notify provider prior to proceeding with today's injection if:  o Hemoglobin GREATER THAN 11 g/dL (parameter set by prescribing provider)  o Increase in hemoglobin GREATER THAN 1 g/dL   o SBP GREATER THAN 160 mmHg    Lab Results   Component Value Date/Time    HGB 9.6 (L) 08/07/2024 1631    HGB 10.8 (L) 07/12/2024 0853    HGB 9.7 (L) 06/07/2024 1104    HGB 8.1 (A) 10/19/2023 1631     No results found for: \"HGBCAP\"     Labs reviewed and patient meets treatment conditions? Yes    DRUG SPECIFIC QUESTIONS:  - Does the patient have uncontrolled hypertension?  No    (Use is contraindicated in patients with uncontrolled hypertension)     - Educate on the following:? Yes       - Increased risk of serious cardiovascular reactions, thromboembolic reactions, stroke and tumor progression.        - Need to undergo regular blood pressure monitoring. Adhere to prescribed anti-hypertensive regimen and follow recommended dietary restrictions.        - Need to contact their healthcare provider for new onset neurologic symptoms or change in seizure frequency.        - Need for  regular laboratory tests " to monitor hemoglobin levels.          Weight Based Drug Calculations:    WEIGHT BASED DRUGS: NOT APPLICABLE / FLAT DOSE          Initiated By: Dianna Ortega RN

## 2024-08-09 NOTE — PATIENT INSTRUCTIONS
Today :We administered epoetin abhinav.     For:   1. Anemia, unspecified type         Your next appointment is due in:  7 DAYS         Please read the  Medication Guide that was given to you and reviewed during todays visit.     (Tell all doctors including dentists that you are taking this medication)     Go to the emergency room or call 911 if:  -You have signs of allergic reaction:   -Rash, hives, itching.   -Swollen, blistered, peeling skin.   -Swelling of face, lips, mouth, tongue or throat.   -Tightness of chest, trouble breathing, swallowing or talking     Call your doctor:  - If IV / injection site gets red, warm, swollen, itchy or leaks fluid or pus.     (Leave dressing on your IV site for at least 2 hours and keep area clean and dry  - If you get sick or have symptoms of infection or are not feeling well for any reason.    (Wash your hands often, stay away from people who are sick)  - If you have side effects from your medication that do not go away or are bothersome.     (Refer to the teaching your nurse gave you for side effects to call your doctor about)    - Common side effects may include:  stuffy nose, headache, feeling tired, muscle aches, upset stomach  - Before receiving any vaccines     - Call [facility] at   If:  - You get sick, are on antibiotics, have had a recent vaccine, have surgery or dental work and your doctor wants your visit rescheduled.  - You need to cancel and reschedule your visit for any reason. Call at least 2 days before your visit if you need to cancel.   - Your insurance changes before your next visit.    (We will need to get approval from your new insurance. This can take up to two weeks.)     [facility] is opened Monday thru Friday. We are closed on weekends and holidays.   Voice mail will take your call if the center is closed. If you leave a message please allow 24 hours for a call back during weekdays. If you leave a message on a  weekend/holiday, we will call you back the next business day.

## 2024-08-12 DIAGNOSIS — I12.9 HYPERTENSIVE CHRONIC KIDNEY DISEASE WITH STAGE 1 THROUGH STAGE 4 CHRONIC KIDNEY DISEASE, OR UNSPECIFIED CHRONIC KIDNEY DISEASE: ICD-10-CM

## 2024-08-12 RX ORDER — METOPROLOL TARTRATE 25 MG/1
25 TABLET, FILM COATED ORAL DAILY
Qty: 60 TABLET | Refills: 3 | Status: SHIPPED | OUTPATIENT
Start: 2024-08-12

## 2024-08-12 RX ORDER — ALLOPURINOL 100 MG/1
100 TABLET ORAL DAILY
Qty: 30 TABLET | Refills: 6 | Status: SHIPPED | OUTPATIENT
Start: 2024-08-12

## 2024-08-12 RX ORDER — PRAZOSIN HYDROCHLORIDE 1 MG/1
2 CAPSULE ORAL 2 TIMES DAILY
Qty: 120 CAPSULE | Refills: 5 | Status: SHIPPED | OUTPATIENT
Start: 2024-08-12 | End: 2025-02-08

## 2024-08-12 RX ORDER — CYCLOBENZAPRINE HCL 10 MG
10 TABLET ORAL AS NEEDED
Qty: 20 TABLET | Refills: 0 | Status: SHIPPED | OUTPATIENT
Start: 2024-08-12

## 2024-08-12 RX ORDER — SODIUM BICARBONATE 650 MG/1
650 TABLET ORAL 2 TIMES DAILY
Qty: 60 TABLET | Refills: 6 | Status: SHIPPED | OUTPATIENT
Start: 2024-08-12

## 2024-08-16 ENCOUNTER — APPOINTMENT (OUTPATIENT)
Dept: INFUSION THERAPY | Facility: CLINIC | Age: 79
End: 2024-08-16
Payer: MEDICARE

## 2024-08-16 VITALS
HEART RATE: 67 BPM | DIASTOLIC BLOOD PRESSURE: 77 MMHG | TEMPERATURE: 97.8 F | BODY MASS INDEX: 27.86 KG/M2 | WEIGHT: 152.34 LBS | SYSTOLIC BLOOD PRESSURE: 144 MMHG | RESPIRATION RATE: 18 BRPM | OXYGEN SATURATION: 97 %

## 2024-08-16 DIAGNOSIS — D64.9 ANEMIA, UNSPECIFIED TYPE: ICD-10-CM

## 2024-08-16 RX ORDER — EPINEPHRINE 0.3 MG/.3ML
0.3 INJECTION SUBCUTANEOUS EVERY 5 MIN PRN
OUTPATIENT
Start: 2024-08-23

## 2024-08-16 RX ORDER — DIPHENHYDRAMINE HYDROCHLORIDE 50 MG/ML
50 INJECTION INTRAMUSCULAR; INTRAVENOUS AS NEEDED
OUTPATIENT
Start: 2024-08-23

## 2024-08-16 RX ORDER — ALBUTEROL SULFATE 0.83 MG/ML
3 SOLUTION RESPIRATORY (INHALATION) AS NEEDED
OUTPATIENT
Start: 2024-08-23

## 2024-08-16 RX ORDER — FAMOTIDINE 10 MG/ML
20 INJECTION INTRAVENOUS ONCE AS NEEDED
OUTPATIENT
Start: 2024-08-23

## 2024-08-16 ASSESSMENT — ENCOUNTER SYMPTOMS
LEG SWELLING: 0
EXTREMITY WEAKNESS: 0
COUGH: 0
LIGHT-HEADEDNESS: 0
NUMBNESS: 0
PALPITATIONS: 0
DIZZINESS: 0
WOUND: 0
WHEEZING: 0
SHORTNESS OF BREATH: 0

## 2024-08-16 NOTE — PROGRESS NOTES
[facility]   Infusion Clinic Note   Date: 2024   Name: Vida May  : 1945   MRN: [id number]          Reason for Visit: Injections (Every 7 days Procrit [id number] subcutaneous injection)         Today: We administered epoetin abhinav.       Visit Type: INJECTION       Ordered By: Tucker Pepper DO       Accompanied by: Daughter       Diagnosis: Anemia, unspecified type        Allergies:   Allergies as of 2024   • (No Known Allergies)          Current Medications has a current medication list which includes the following prescription(s): allopurinol, amlodipine, aspirin, atorvastatin, clonidine, cyclobenzaprine, epoetin abhinav, ferrous sulfate (325 mg ferrous sulfate), furosemide, glipizide, hydralazine, lidocaine, losartan, metoprolol tartrate, omeprazole, prazosin, and sodium bicarbonate.       Vitals:   There were no vitals filed for this visit.          Infusion Pre-procedure Checklist:   - Allergies reviewed: yes   - Medications reviewed: yes       - Previous reaction to current treatment: no      Assess patient for the concerns below. Document provider notification as appropriate.  - Active or recent infection with/without current antibiotic use: no  - Recent or planned invasive dental work: no  - Recent or planned surgeries: no  - Recently received or plans to receive vaccinations: no  - Has treatment related toxicities: no  - Is pregnant:  no      Pain: 0   - Is the pain different from normal: n/a   - Is the pain tolerable: n/a   - Is your Doctor aware:  n/a                Fall Risk Screening: Janette Fall Risk  History of Falling, Immediate or Within 3 Months: No  Ambulatory Aid: Walks without aid/bedrest/nurse assist  Intravenous Therapy/Heparin Lock: No  Gait/Transferring: Normal/bedrest/immobile  Mental Status: Oriented to own ability       Review Of Systems:  Review of Systems   Respiratory:  Negative for cough, shortness of breath and wheezing.     Cardiovascular:  Negative for chest pain, leg swelling and palpitations.   Skin:  Negative for itching, rash and wound.   Neurological:  Negative for dizziness, extremity weakness, light-headedness and numbness.         ROS completed? Yes      Infusion Readiness:  - Assessment Concerns Related to Infusion: No  - Provider notified: n/a      Document Below Only If Indicated:   New Patient Education:    N/A (returning patient for continuation of therapy. Ongoing education provided as needed.)        Treatment Conditions & Drug Specific Questions:    Epoetin Celestino (EPOGEN. PROCRIT, RETACRIT)    TREATMENT CONDITIONS:    Unless otherwise specified on patient specific therapy plan HOLD and notify provider prior to proceeding with today's injection if:  o Hemoglobin GREATER THAN 11 g/dL (parameter set by prescribing provider)  o Increase in hemoglobin GREATER THAN 1 g/dL   o SBP GREATER THAN 160 mmHg    Lab Results   Component Value Date/Time    HGB 9.6 (L) 08/07/2024 1631    HGB 10.8 (L) 07/12/2024 0853    HGB 9.7 (L) 06/07/2024 1104    HGB 8.1 (A) 10/19/2023 1631     Labs reviewed and patient meets treatment conditions? Yes    DRUG SPECIFIC QUESTIONS:  - Does the patient have uncontrolled hypertension?  No,controlled with medication    (Use is contraindicated in patients with uncontrolled hypertension)     - Educate on the following:? Yes       - Increased risk of serious cardiovascular reactions, thromboembolic reactions, stroke and tumor progression.        - Need to undergo regular blood pressure monitoring. Adhere to prescribed anti-hypertensive regimen and follow recommended dietary restrictions.        - Need to contact their healthcare provider for new onset neurologic symptoms or change in seizure frequency.        - Need for  regular laboratory tests to monitor hemoglobin levels.          Weight Based Drug Calculations:    WEIGHT BASED DRUGS: NOT APPLICABLE / FLAT DOSE          Initiated By: Camille Hansen LPN

## 2024-08-16 NOTE — PATIENT INSTRUCTIONS
Today :We administered epoetin abhinav. Procrit [id number] subcutaneous injection right upper arm  Hold Hgb >11  8-7-2024 Hgb 9.6  Hgb every 4 weeks    For:   1. Anemia, unspecified type       Your next appointment is due in:  7 days        Please read the  Medication Guide that was given to you and reviewed during todays visit.     (Tell all doctors including dentists that you are taking this medication)     Go to the emergency room or call 911 if:  -You have signs of allergic reaction:   -Rash, hives, itching.   -Swollen, blistered, peeling skin.   -Swelling of face, lips, mouth, tongue or throat.   -Tightness of chest, trouble breathing, swallowing or talking     Call your doctor:  - If injection site gets red, warm, swollen, itchy or leaks fluid or pus.     (Leave band aid on your injection site for at least 2 hours and keep area clean and dry  - If you get sick or have symptoms of infection or are not feeling well for any reason.    (Wash your hands often, stay away from people who are sick)  - If you have side effects from your medication that do not go away or are bothersome.     (Refer to the teaching your nurse gave you for side effects to call your doctor about)    - Common side effects may include:  stuffy nose, headache, feeling tired, muscle aches, upset stomach  - Before receiving any vaccines     - Call [facility] at   If:  - You get sick, are on antibiotics, have had a recent vaccine, have surgery or dental work and your doctor wants your visit rescheduled.  - You need to cancel and reschedule your visit for any reason. Call at least 2 days before your visit if you need to cancel.   - Your insurance changes before your next visit.    (We will need to get approval from your new insurance. This can take up to two weeks.)     [facility] is opened Monday thru Friday. We are closed on weekends and holidays.   Voice mail will take your call if the center is closed. If  you leave a message please allow 24 hours for a call back during weekdays. If you leave a message on a weekend/holiday, we will call you back the next business day.

## 2024-08-23 ENCOUNTER — APPOINTMENT (OUTPATIENT)
Dept: INFUSION THERAPY | Facility: CLINIC | Age: 79
End: 2024-08-23
Payer: MEDICARE

## 2024-08-23 VITALS
HEART RATE: 60 BPM | DIASTOLIC BLOOD PRESSURE: 77 MMHG | SYSTOLIC BLOOD PRESSURE: 159 MMHG | TEMPERATURE: 97.5 F | RESPIRATION RATE: 16 BRPM | OXYGEN SATURATION: 95 %

## 2024-08-23 DIAGNOSIS — D64.9 ANEMIA, UNSPECIFIED TYPE: ICD-10-CM

## 2024-08-23 RX ORDER — EPINEPHRINE 0.3 MG/.3ML
0.3 INJECTION SUBCUTANEOUS EVERY 5 MIN PRN
OUTPATIENT
Start: 2024-08-30

## 2024-08-23 RX ORDER — FAMOTIDINE 10 MG/ML
20 INJECTION INTRAVENOUS ONCE AS NEEDED
OUTPATIENT
Start: 2024-08-30

## 2024-08-23 RX ORDER — ALBUTEROL SULFATE 0.83 MG/ML
3 SOLUTION RESPIRATORY (INHALATION) AS NEEDED
OUTPATIENT
Start: 2024-08-30

## 2024-08-23 RX ORDER — DIPHENHYDRAMINE HYDROCHLORIDE 50 MG/ML
50 INJECTION INTRAMUSCULAR; INTRAVENOUS AS NEEDED
OUTPATIENT
Start: 2024-08-30

## 2024-08-23 ASSESSMENT — ENCOUNTER SYMPTOMS
WOUND: 0
COUGH: 0
LEG SWELLING: 0
LIGHT-HEADEDNESS: 0
EXTREMITY WEAKNESS: 0
SHORTNESS OF BREATH: 0
PALPITATIONS: 0
WHEEZING: 0
DIZZINESS: 0
NUMBNESS: 0

## 2024-08-23 NOTE — PROGRESS NOTES
[facility]   Infusion Clinic Note   Date: 2024   Name: Vida May  : 1945   MRN: [id number]          Reason for Visit: Injections (Every 7 days Procrit [id number] subcutaneous injection)         Today: We administered epoetin abhinav.       Visit Type: INJECTION       Ordered By: Tucker Pepper DO       Accompanied by: Daughter       Diagnosis: Anemia, unspecified type        Allergies:   Allergies as of 2024   • (No Known Allergies)          Current Medications has a current medication list which includes the following prescription(s): allopurinol, amlodipine, aspirin, atorvastatin, clonidine, cyclobenzaprine, epoetin abhinav, ferrous sulfate (325 mg ferrous sulfate), furosemide, glipizide, hydralazine, lidocaine, losartan, metoprolol tartrate, omeprazole, prazosin, and sodium bicarbonate.       Vitals:   Vitals:    24 1605   BP: 159/77   Pulse: 60   Resp: 16   Temp: 36.4 °C (97.5 °F)   TempSrc: Temporal   SpO2: 95%             Infusion Pre-procedure Checklist:   - Allergies reviewed: yes   - Medications reviewed: yes       - Previous reaction to current treatment: no      Assess patient for the concerns below. Document provider notification as appropriate.  - Active or recent infection with/without current antibiotic use: no  - Recent or planned invasive dental work: no  - Recent or planned surgeries: no  - Recently received or plans to receive vaccinations: no  - Has treatment related toxicities: no  - Is pregnant:  no      Pain: 0   - Is the pain different from normal: n/a   - Is the pain tolerable: n/a   - Is your Doctor aware:  n/a                Fall Risk Screening: Janette Fall Risk  History of Falling, Immediate or Within 3 Months: No  Ambulatory Aid: Walks without aid/bedrest/nurse assist  Intravenous Therapy/Heparin Lock: No  Gait/Transferring: Normal/bedrest/immobile  Mental Status: Oriented to own ability       Review Of Systems:  Review of Systems    Respiratory:  Negative for cough, shortness of breath and wheezing.    Cardiovascular:  Negative for chest pain, leg swelling and palpitations.   Skin:  Negative for itching, rash and wound.   Neurological:  Negative for dizziness, extremity weakness, light-headedness and numbness.         ROS completed? Yes      Infusion Readiness:  - Assessment Concerns Related to Infusion: No  - Provider notified: n/a      Document Below Only If Indicated:   New Patient Education:    N/A (returning patient for continuation of therapy. Ongoing education provided as needed.)        Treatment Conditions & Drug Specific Questions:    Epoetin Celestino (EPOGEN. PROCRIT, RETACRIT)    TREATMENT CONDITIONS:    Unless otherwise specified on patient specific therapy plan HOLD and notify provider prior to proceeding with today's injection if:  o Hemoglobin GREATER THAN 11 g/dL (parameter set by prescribing provider)  o Increase in hemoglobin GREATER THAN 1 g/dL   o SBP GREATER THAN 160 mmHg    Lab Results   Component Value Date/Time    HGB 9.6 (L) 08/07/2024 1631    HGB 10.8 (L) 07/12/2024 0853    HGB 9.7 (L) 06/07/2024 1104    HGB 8.1 (A) 10/19/2023 1631     Labs reviewed and patient meets treatment conditions? Yes    DRUG SPECIFIC QUESTIONS:  - Does the patient have uncontrolled hypertension?  No, controlled with medication    (Use is contraindicated in patients with uncontrolled hypertension)     - Educate on the following:? Yes       - Increased risk of serious cardiovascular reactions, thromboembolic reactions, stroke and tumor progression.        - Need to undergo regular blood pressure monitoring. Adhere to prescribed anti-hypertensive regimen and follow recommended dietary restrictions.        - Need to contact their healthcare provider for new onset neurologic symptoms or change in seizure frequency.        - Need for  regular laboratory tests to monitor hemoglobin levels.          Weight Based Drug Calculations:    WEIGHT BASED  DRUGS: NOT APPLICABLE / FLAT DOSE          Initiated By: Camille Hansen LPN

## 2024-08-23 NOTE — PATIENT INSTRUCTIONS
Today :We administered epoetin abhinav. Procrit [id number] subcutaneous injection left upper arm  Hold Hgb >11  8-7-2024 Hgb 9.6  Hgb every 4 weeks    For:   1. Anemia, unspecified type       Your next appointment is due in:  7 days        Please read the  Medication Guide that was given to you and reviewed during todays visit.     (Tell all doctors including dentists that you are taking this medication)     Go to the emergency room or call 911 if:  -You have signs of allergic reaction:   -Rash, hives, itching.   -Swollen, blistered, peeling skin.   -Swelling of face, lips, mouth, tongue or throat.   -Tightness of chest, trouble breathing, swallowing or talking     Call your doctor:  - If injection site gets red, warm, swollen, itchy or leaks fluid or pus.     (Leave band aid on your injection site for at least 2 hours and keep area clean and dry  - If you get sick or have symptoms of infection or are not feeling well for any reason.    (Wash your hands often, stay away from people who are sick)  - If you have side effects from your medication that do not go away or are bothersome.     (Refer to the teaching your nurse gave you for side effects to call your doctor about)    - Common side effects may include:  stuffy nose, headache, feeling tired, muscle aches, upset stomach  - Before receiving any vaccines     - Call [facility] at   If:  - You get sick, are on antibiotics, have had a recent vaccine, have surgery or dental work and your doctor wants your visit rescheduled.  - You need to cancel and reschedule your visit for any reason. Call at least 2 days before your visit if you need to cancel.   - Your insurance changes before your next visit.    (We will need to get approval from your new insurance. This can take up to two weeks.)     [facility] is opened Monday thru Friday. We are closed on weekends and holidays.   Voice mail will take your call if the center is closed. If  you leave a message please allow 24 hours for a call back during weekdays. If you leave a message on a weekend/holiday, we will call you back the next business day.

## 2024-08-27 ENCOUNTER — LAB (OUTPATIENT)
Dept: LAB | Facility: LAB | Age: 79
End: 2024-08-27
Payer: MEDICARE

## 2024-08-27 ENCOUNTER — HOSPITAL ENCOUNTER (OUTPATIENT)
Dept: RADIOLOGY | Facility: CLINIC | Age: 79
Discharge: HOME | End: 2024-08-27
Payer: MEDICARE

## 2024-08-27 DIAGNOSIS — D86.0 SARCOIDOSIS OF LUNG (MULTI): ICD-10-CM

## 2024-08-27 DIAGNOSIS — E11.9 TYPE 2 DIABETES MELLITUS WITHOUT COMPLICATIONS (MULTI): ICD-10-CM

## 2024-08-27 DIAGNOSIS — I11.9 HYPERTENSIVE HEART DISEASE WITHOUT HEART FAILURE: ICD-10-CM

## 2024-08-27 DIAGNOSIS — N18.2 CHRONIC KIDNEY DISEASE, STAGE 2 (MILD): ICD-10-CM

## 2024-08-27 LAB
ALBUMIN SERPL BCP-MCNC: 3.8 G/DL (ref 3.4–5)
ALP SERPL-CCNC: 203 U/L (ref 33–136)
ALT SERPL W P-5'-P-CCNC: 45 U/L (ref 7–45)
ANION GAP SERPL CALC-SCNC: 18 MMOL/L (ref 10–20)
AST SERPL W P-5'-P-CCNC: 28 U/L (ref 9–39)
BILIRUB SERPL-MCNC: 0.5 MG/DL (ref 0–1.2)
BUN SERPL-MCNC: 65 MG/DL (ref 6–23)
CALCIUM SERPL-MCNC: 9.3 MG/DL (ref 8.6–10.6)
CHLORIDE SERPL-SCNC: 105 MMOL/L (ref 98–107)
CHOLEST SERPL-MCNC: 210 MG/DL (ref 0–199)
CHOLESTEROL/HDL RATIO: 4.8
CO2 SERPL-SCNC: 20 MMOL/L (ref 21–32)
CREAT SERPL-MCNC: 4.88 MG/DL (ref 0.5–1.05)
EGFRCR SERPLBLD CKD-EPI 2021: 9 ML/MIN/1.73M*2
ERYTHROCYTE [DISTWIDTH] IN BLOOD BY AUTOMATED COUNT: 16 % (ref 11.5–14.5)
EST. AVERAGE GLUCOSE BLD GHB EST-MCNC: 189 MG/DL
GLUCOSE SERPL-MCNC: 169 MG/DL (ref 74–99)
HBA1C MFR BLD: 8.2 %
HCT VFR BLD AUTO: 37.1 % (ref 36–46)
HDLC SERPL-MCNC: 43.6 MG/DL
HGB BLD-MCNC: 11.5 G/DL (ref 12–16)
LDLC SERPL CALC-MCNC: 129 MG/DL
MCH RBC QN AUTO: 28.9 PG (ref 26–34)
MCHC RBC AUTO-ENTMCNC: 31 G/DL (ref 32–36)
MCV RBC AUTO: 93 FL (ref 80–100)
NON HDL CHOLESTEROL: 166 MG/DL (ref 0–149)
NRBC BLD-RTO: 0 /100 WBCS (ref 0–0)
PLATELET # BLD AUTO: 121 X10*3/UL (ref 150–450)
POTASSIUM SERPL-SCNC: 4 MMOL/L (ref 3.5–5.3)
PROT SERPL-MCNC: 7.8 G/DL (ref 6.4–8.2)
RBC # BLD AUTO: 3.98 X10*6/UL (ref 4–5.2)
SODIUM SERPL-SCNC: 139 MMOL/L (ref 136–145)
TRIGL SERPL-MCNC: 186 MG/DL (ref 0–149)
TSH SERPL-ACNC: 1.71 MIU/L (ref 0.44–3.98)
VLDL: 37 MG/DL (ref 0–40)
WBC # BLD AUTO: 5.2 X10*3/UL (ref 4.4–11.3)

## 2024-08-27 PROCEDURE — 84443 ASSAY THYROID STIM HORMONE: CPT

## 2024-08-27 PROCEDURE — 83036 HEMOGLOBIN GLYCOSYLATED A1C: CPT

## 2024-08-27 PROCEDURE — 71250 CT THORAX DX C-: CPT | Performed by: RADIOLOGY

## 2024-08-27 PROCEDURE — 80061 LIPID PANEL: CPT

## 2024-08-27 PROCEDURE — 36415 COLL VENOUS BLD VENIPUNCTURE: CPT

## 2024-08-27 PROCEDURE — 85027 COMPLETE CBC AUTOMATED: CPT

## 2024-08-27 PROCEDURE — 71250 CT THORAX DX C-: CPT

## 2024-08-27 PROCEDURE — 80053 COMPREHEN METABOLIC PANEL: CPT

## 2024-08-30 ENCOUNTER — APPOINTMENT (OUTPATIENT)
Dept: INFUSION THERAPY | Facility: CLINIC | Age: 79
End: 2024-08-30
Payer: MEDICARE

## 2024-09-06 ENCOUNTER — APPOINTMENT (OUTPATIENT)
Dept: INFUSION THERAPY | Facility: CLINIC | Age: 79
End: 2024-09-06
Payer: MEDICARE

## 2024-09-13 ENCOUNTER — APPOINTMENT (OUTPATIENT)
Dept: INFUSION THERAPY | Facility: CLINIC | Age: 79
End: 2024-09-13
Payer: MEDICARE

## 2024-09-20 ENCOUNTER — APPOINTMENT (OUTPATIENT)
Dept: INFUSION THERAPY | Facility: CLINIC | Age: 79
End: 2024-09-20
Payer: MEDICARE

## 2024-09-24 ENCOUNTER — LAB (OUTPATIENT)
Dept: LAB | Facility: LAB | Age: 79
End: 2024-09-24
Payer: MEDICARE

## 2024-09-24 DIAGNOSIS — D64.9 ANEMIA, UNSPECIFIED TYPE: ICD-10-CM

## 2024-09-24 LAB
ERYTHROCYTE [DISTWIDTH] IN BLOOD BY AUTOMATED COUNT: 15.9 % (ref 11.5–14.5)
HCT VFR BLD AUTO: 35 % (ref 36–46)
HGB BLD-MCNC: 10.6 G/DL (ref 12–16)
MCH RBC QN AUTO: 29.3 PG (ref 26–34)
MCHC RBC AUTO-ENTMCNC: 30.3 G/DL (ref 32–36)
MCV RBC AUTO: 97 FL (ref 80–100)
NRBC BLD-RTO: 0 /100 WBCS (ref 0–0)
PLATELET # BLD AUTO: 114 X10*3/UL (ref 150–450)
RBC # BLD AUTO: 3.62 X10*6/UL (ref 4–5.2)
WBC # BLD AUTO: 5.4 X10*3/UL (ref 4.4–11.3)

## 2024-09-24 PROCEDURE — 85027 COMPLETE CBC AUTOMATED: CPT

## 2024-09-24 PROCEDURE — 36415 COLL VENOUS BLD VENIPUNCTURE: CPT

## 2024-09-27 ENCOUNTER — APPOINTMENT (OUTPATIENT)
Dept: INFUSION THERAPY | Facility: CLINIC | Age: 79
End: 2024-09-27
Payer: MEDICARE

## 2024-09-27 VITALS
TEMPERATURE: 97.6 F | HEART RATE: 55 BPM | RESPIRATION RATE: 16 BRPM | DIASTOLIC BLOOD PRESSURE: 68 MMHG | OXYGEN SATURATION: 99 % | SYSTOLIC BLOOD PRESSURE: 163 MMHG

## 2024-09-27 DIAGNOSIS — D64.9 ANEMIA, UNSPECIFIED TYPE: ICD-10-CM

## 2024-09-27 RX ORDER — FAMOTIDINE 10 MG/ML
20 INJECTION INTRAVENOUS ONCE AS NEEDED
OUTPATIENT
Start: 2024-10-04

## 2024-09-27 RX ORDER — ALBUTEROL SULFATE 0.83 MG/ML
3 SOLUTION RESPIRATORY (INHALATION) AS NEEDED
OUTPATIENT
Start: 2024-10-04

## 2024-09-27 RX ORDER — DIPHENHYDRAMINE HYDROCHLORIDE 50 MG/ML
50 INJECTION INTRAMUSCULAR; INTRAVENOUS AS NEEDED
OUTPATIENT
Start: 2024-10-04

## 2024-09-27 RX ORDER — EPINEPHRINE 0.3 MG/.3ML
0.3 INJECTION SUBCUTANEOUS EVERY 5 MIN PRN
OUTPATIENT
Start: 2024-10-04

## 2024-09-27 ASSESSMENT — ENCOUNTER SYMPTOMS
LEG SWELLING: 0
DIZZINESS: 0
PALPITATIONS: 0
COUGH: 0
EXTREMITY WEAKNESS: 0
WHEEZING: 0
NUMBNESS: 0
WOUND: 0
LIGHT-HEADEDNESS: 0
SHORTNESS OF BREATH: 0

## 2024-09-27 NOTE — PATIENT INSTRUCTIONS
Today :We administered epoetin abhinav. Procrit [id number] subcutaneous injection right upper arm  Hold Hgb >11  9- Hgb 10.6  Hgb every 4 weeks    For:   1. Anemia, unspecified type       Your next appointment is due in:  7 days        Please read the  Medication Guide that was given to you and reviewed during todays visit.     (Tell all doctors including dentists that you are taking this medication)     Go to the emergency room or call 911 if:  -You have signs of allergic reaction:   -Rash, hives, itching.   -Swollen, blistered, peeling skin.   -Swelling of face, lips, mouth, tongue or throat.   -Tightness of chest, trouble breathing, swallowing or talking     Call your doctor:  - If injection site gets red, warm, swollen, itchy or leaks fluid or pus.     (Leave band aid  on your injection site for at least 2 hours and keep area clean and dry  - If you get sick or have symptoms of infection or are not feeling well for any reason.    (Wash your hands often, stay away from people who are sick)  - If you have side effects from your medication that do not go away or are bothersome.     (Refer to the teaching your nurse gave you for side effects to call your doctor about)    - Common side effects may include:  stuffy nose, headache, feeling tired, muscle aches, upset stomach  - Before receiving any vaccines     - Call the [facility] at   If:  - You get sick, are on antibiotics, have had a recent vaccine, have surgery or dental work and your doctor wants your visit rescheduled.  - You need to cancel and reschedule your visit for any reason. Call at least 2 days before your visit if you need to cancel.   - Your insurance changes before your next visit.    (We will need to get approval from your new insurance. This can take up to two weeks.)     [facility] is opened Monday thru Friday. We are closed on weekends and holidays.   Voice mail will take your call if the center is closed.  If you leave a message please allow 24 hours for a call back during weekdays. If you leave a message on a weekend/holiday, we will call you back the next business day.               English

## 2024-09-27 NOTE — PROGRESS NOTES
[facility] [place]   Infusion Clinic Note   Date: 2024   Name: Vida May  : 1945   MRN: [id number]          Reason for Visit: Injections (Every 7 days Procrit [id number] subcutaneous injection)         Today: We administered epoetin abhinav.       Visit Type: INJECTION       Ordered By: Tucker Pepper DO       Accompanied by: Daughter       Diagnosis: Anemia, unspecified type        Allergies:   Allergies as of 2024    (No Known Allergies)          Current Medications has a current medication list which includes the following prescription(s): allopurinol, amlodipine, aspirin, atorvastatin, clonidine, cyclobenzaprine, epoetin abhinav, ferrous sulfate (325 mg ferrous sulfate), furosemide, glipizide, hydralazine, lidocaine, losartan, metoprolol tartrate, omeprazole, prazosin, and sodium bicarbonate.       Vitals:   Vitals:    24 1522   BP: 163/68   Pulse: 55   Resp: 16   Temp: 36.4 °C (97.6 °F)   TempSrc: Temporal   SpO2: 99%             Infusion Pre-procedure Checklist:   - Allergies reviewed: yes   - Medications reviewed: yes       - Previous reaction to current treatment: no      Assess patient for the concerns below. Document provider notification as appropriate.  - Active or recent infection with/without current antibiotic use: no  - Recent or planned invasive dental work: no  - Recent or planned surgeries: no  - Recently received or plans to receive vaccinations: no  - Has treatment related toxicities: no  - Is pregnant:  no      Pain: 0   - Is the pain different from normal: n/a   - Is your Doctor aware:  n/a                Fall Risk Screening: Janette Fall Risk  History of Falling, Immediate or Within 3 Months: No  Ambulatory Aid: Walks without aid/bedrest/nurse assist  Intravenous Therapy/Heparin Lock: No  Gait/Transferring: Normal/bedrest/immobile  Mental Status: Oriented to own ability       Review Of Systems:  Review of Systems   Respiratory:  Negative for cough,  shortness of breath and wheezing.    Cardiovascular:  Negative for chest pain, leg swelling and palpitations.   Skin:  Negative for itching, rash and wound.   Neurological:  Negative for dizziness, extremity weakness, light-headedness and numbness.         ROS completed? Yes      Infusion Readiness:  - Assessment Concerns Related to Infusion: No  - Provider notified: n/a      Document Below Only If Indicated:   New Patient Education:    N/A (returning patient for continuation of therapy. Ongoing education provided as needed.)        Treatment Conditions & Drug Specific Questions:    Epoetin Celestino (EPOGEN. PROCRIT, RETACRIT)    TREATMENT CONDITIONS:    Unless otherwise specified on patient specific therapy plan HOLD and notify provider prior to proceeding with today's injection if:  o Hemoglobin GREATER THAN 47903/68 g/dL (parameter set by prescribing provider)  o Increase in hemoglobin GREATER THAN 1 g/dL   o SBP GREATER THAN 160 mmHg    Lab Results   Component Value Date/Time    HGB 10.6 (L) 09/24/2024 1001    HGB 11.5 (L) 08/27/2024 1021    HGB 9.6 (L) 08/07/2024 1631    HGB 8.1 (A) 10/19/2023 1631     Labs reviewed and patient meets treatment conditions? Yes    DRUG SPECIFIC QUESTIONS:  - Does the patient have uncontrolled hypertension?  No, controlled with medication    (Use is contraindicated in patients with uncontrolled hypertension)     - Educate on the following:? Yes       - Increased risk of serious cardiovascular reactions, thromboembolic reactions, stroke and tumor progression.        - Need to undergo regular blood pressure monitoring. Adhere to prescribed anti-hypertensive regimen and follow recommended dietary restrictions.        - Need to contact their healthcare provider for new onset neurologic symptoms or change in seizure frequency.        - Need for  regular laboratory tests to monitor hemoglobin levels.          Weight Based Drug Calculations:    WEIGHT BASED DRUGS: NOT APPLICABLE / FLAT DOSE           Initiated By: Camille Hansen LPN

## 2024-10-04 ENCOUNTER — APPOINTMENT (OUTPATIENT)
Dept: INFUSION THERAPY | Facility: CLINIC | Age: 79
End: 2024-10-04
Payer: MEDICARE

## 2024-10-04 VITALS
HEART RATE: 57 BPM | DIASTOLIC BLOOD PRESSURE: 82 MMHG | TEMPERATURE: 97.7 F | RESPIRATION RATE: 16 BRPM | OXYGEN SATURATION: 98 % | SYSTOLIC BLOOD PRESSURE: 202 MMHG

## 2024-10-04 DIAGNOSIS — D64.9 ANEMIA, UNSPECIFIED TYPE: ICD-10-CM

## 2024-10-04 ASSESSMENT — ENCOUNTER SYMPTOMS
DIZZINESS: 0
WHEEZING: 0
COUGH: 0
PALPITATIONS: 0
LEG SWELLING: 0
SHORTNESS OF BREATH: 0
LIGHT-HEADEDNESS: 0
WOUND: 0
EXTREMITY WEAKNESS: 0
NUMBNESS: 0

## 2024-10-04 NOTE — PROGRESS NOTES
[facility]   Infusion Clinic Note   Date: 2024   Name: Vida May  : 1945   MRN: [id number]          Reason for Visit: Injections (Every 7 days Procrit [id number] subcutaneous injection)         Today: Vida May had no medications administered during this visit.   Procrit not given this visit due to blood pressure readings elevated.  Encouraged patient to notify provider of elevated blood pressures, verbalizes understanding      Visit Type: INJECTION       Ordered By: Tucker Pepper DO       Accompanied by: Daughter       Diagnosis: Anemia, unspecified type        Allergies:   Allergies as of 10/04/2024    (No Known Allergies)          Current Medications has a current medication list which includes the following prescription(s): allopurinol, amlodipine, aspirin, atorvastatin, clonidine, cyclobenzaprine, epoetin abhinav, ferrous sulfate (325 mg ferrous sulfate), furosemide, glipizide, hydralazine, lidocaine, losartan, metoprolol tartrate, omeprazole, prazosin, and sodium bicarbonate.       Vitals:   Vitals:    10/04/24 1525 10/04/24 1545   BP: (!) 216/80 (!) 202/82   Pulse: 65 57   Resp: 16    Temp: 36.5 °C (97.7 °F)    TempSrc: Temporal    SpO2: 98%                Infusion Pre-procedure Checklist:   - Allergies reviewed: yes   - Medications reviewed: yes       - Previous reaction to current treatment: no      Assess patient for the concerns below. Document provider notification as appropriate.  - Active or recent infection with/without current antibiotic use: no  - Recent or planned invasive dental work: no  - Recent or planned surgeries: no  - Recently received or plans to receive vaccinations: no  - Has treatment related toxicities: no  - Is pregnant:  no      Pain: 0   - Is the pain different from normal: n/a   - Is your Doctor aware:  n/a                Fall Risk Screening: Janette Fall Risk  History of Falling, Immediate or Within 3 Months: No  Ambulatory Aid: Walks  without aid/bedrest/nurse assist  Intravenous Therapy/Heparin Lock: No  Gait/Transferring: Normal/bedrest/immobile  Mental Status: Oriented to own ability       Review Of Systems:  Review of Systems   Respiratory:  Negative for cough, shortness of breath and wheezing.    Cardiovascular:  Negative for chest pain, leg swelling and palpitations.   Skin:  Negative for itching, rash and wound.   Neurological:  Negative for dizziness, extremity weakness, light-headedness and numbness.         ROS completed? Yes      Infusion Readiness:  - Assessment Concerns Related to Infusion: No  - Provider notified: n/a      Document Below Only If Indicated:   New Patient Education:    N/A (returning patient for continuation of therapy. Ongoing education provided as needed.)        Treatment Conditions & Drug Specific Questions:    Epoetin Celestino (EPOGEN. PROCRIT, RETACRIT)    TREATMENT CONDITIONS:    Unless otherwise specified on patient specific therapy plan HOLD and notify provider prior to proceeding with today's injection if:  o Hemoglobin GREATER THAN 38376/68 g/dL (parameter set by prescribing provider)  o Increase in hemoglobin GREATER THAN 1 g/dL   o SBP GREATER THAN 160 mmHg    Lab Results   Component Value Date/Time    HGB 10.6 (L) 09/24/2024 1001    HGB 11.5 (L) 08/27/2024 1021    HGB 9.6 (L) 08/07/2024 1631    HGB 8.1 (A) 10/19/2023 1631     Labs reviewed and patient meets treatment conditions? Yes    DRUG SPECIFIC QUESTIONS:  - Does the patient have uncontrolled hypertension? Blood pressure elevated this visit.    (Use is contraindicated in patients with uncontrolled hypertension)     - Educate on the following:? Yes       - Increased risk of serious cardiovascular reactions, thromboembolic reactions, stroke and tumor progression.        - Need to undergo regular blood pressure monitoring. Adhere to prescribed anti-hypertensive regimen and follow recommended dietary restrictions.        - Need to contact their healthcare  provider for new onset neurologic symptoms or change in seizure frequency.        - Need for  regular laboratory tests to monitor hemoglobin levels.          Weight Based Drug Calculations:    WEIGHT BASED DRUGS: NOT APPLICABLE / FLAT DOSE          Initiated By: Camille Hansen LPN

## 2024-10-11 ENCOUNTER — APPOINTMENT (OUTPATIENT)
Dept: INFUSION THERAPY | Facility: CLINIC | Age: 79
End: 2024-10-11
Payer: MEDICARE

## 2024-10-11 DIAGNOSIS — D64.9 ANEMIA, UNSPECIFIED TYPE: ICD-10-CM

## 2024-10-18 ENCOUNTER — APPOINTMENT (OUTPATIENT)
Dept: INFUSION THERAPY | Facility: CLINIC | Age: 79
End: 2024-10-18
Payer: MEDICARE

## 2024-10-18 VITALS
HEART RATE: 73 BPM | TEMPERATURE: 98.4 F | OXYGEN SATURATION: 98 % | DIASTOLIC BLOOD PRESSURE: 73 MMHG | RESPIRATION RATE: 16 BRPM | SYSTOLIC BLOOD PRESSURE: 180 MMHG

## 2024-10-18 DIAGNOSIS — D64.9 ANEMIA, UNSPECIFIED TYPE: ICD-10-CM

## 2024-10-18 ASSESSMENT — ENCOUNTER SYMPTOMS
LEG SWELLING: 0
EXTREMITY WEAKNESS: 0
NUMBNESS: 0
WHEEZING: 0
SHORTNESS OF BREATH: 0
DIZZINESS: 0
WOUND: 0
PALPITATIONS: 0
COUGH: 0
LIGHT-HEADEDNESS: 0

## 2024-10-18 NOTE — PROGRESS NOTES
[facility]   Infusion Clinic Note   Date: 2024   Name: Vida May  : 1945   MRN: [id number]          Reason for Visit: Injections (Every 7 days Procrit [id number] subcutaneous injection)         Today: Vida May had no medications administered during this visit.   Procrit not given this visit due to blood pressure readings elevated.  Encouraged patient to notify provider of elevated blood pressures, verbalizes understanding      Visit Type: INJECTION       Ordered By: Tucker Pepper DO       Accompanied by: Daughter       Diagnosis: Anemia, unspecified type        Allergies:   Allergies as of 10/18/2024    (No Known Allergies)          Current Medications has a current medication list which includes the following prescription(s): allopurinol, amlodipine, aspirin, atorvastatin, clonidine, cyclobenzaprine, epoetin abhinav, ferrous sulfate (325 mg ferrous sulfate), furosemide, glipizide, hydralazine, lidocaine, losartan, metoprolol tartrate, omeprazole, prazosin, and sodium bicarbonate.       Vitals:   Vitals:    10/18/24 1510 10/18/24 1530   BP: (!) 189/69 180/73   Pulse: 71 73   Resp: 16    Temp: 36.9 °C (98.4 °F)    TempSrc: Temporal    SpO2: 98%                  Infusion Pre-procedure Checklist:   - Allergies reviewed: yes   - Medications reviewed: yes       - Previous reaction to current treatment: no      Assess patient for the concerns below. Document provider notification as appropriate.  - Active or recent infection with/without current antibiotic use: no  - Recent or planned invasive dental work: no  - Recent or planned surgeries: no  - Recently received or plans to receive vaccinations: no  - Has treatment related toxicities: no  - Is pregnant:  no      Pain: 0   - Is the pain different from normal: n/a   - Is your Doctor aware:  n/a                Fall Risk Screening: Janette Fall Risk  History of Falling, Immediate or Within 3 Months: No  Ambulatory Aid: Walks  without aid/bedrest/nurse assist  Intravenous Therapy/Heparin Lock: No  Gait/Transferring: Normal/bedrest/immobile  Mental Status: Oriented to own ability       Review Of Systems:  Review of Systems   Respiratory:  Negative for cough, shortness of breath and wheezing.    Cardiovascular:  Negative for chest pain, leg swelling and palpitations.   Skin:  Negative for itching, rash and wound.   Neurological:  Negative for dizziness, extremity weakness, light-headedness and numbness.         ROS completed? Yes      Infusion Readiness:  - Assessment Concerns Related to Infusion: No  - Provider notified: n/a      Document Below Only If Indicated:   New Patient Education:    N/A (returning patient for continuation of therapy. Ongoing education provided as needed.)        Treatment Conditions & Drug Specific Questions:    Epoetin Celestino (EPOGEN. PROCRIT, RETACRIT)    TREATMENT CONDITIONS:    Unless otherwise specified on patient specific therapy plan HOLD and notify provider prior to proceeding with today's injection if:  o Hemoglobin GREATER than 11 (parameter set by prescribing provider)  o Increase in hemoglobin GREATER THAN 1 g/dL   o SBP GREATER THAN 160 mmHg    Lab Results   Component Value Date/Time    HGB 10.6 (L) 09/24/2024 1001    HGB 11.5 (L) 08/27/2024 1021    HGB 9.6 (L) 08/07/2024 1631    HGB 8.1 (A) 10/19/2023 1631     Labs reviewed and patient meets treatment conditions? Yes    DRUG SPECIFIC QUESTIONS:  - Does the patient have uncontrolled hypertension? Blood pressure elevated this visit.    (Use is contraindicated in patients with uncontrolled hypertension)     - Educate on the following:? Yes       - Increased risk of serious cardiovascular reactions, thromboembolic reactions, stroke and tumor progression.        - Need to undergo regular blood pressure monitoring. Adhere to prescribed anti-hypertensive regimen and follow recommended dietary restrictions.        - Need to contact their healthcare provider for  new onset neurologic symptoms or change in seizure frequency.        - Need for  regular laboratory tests to monitor hemoglobin levels.          Weight Based Drug Calculations:    WEIGHT BASED DRUGS: NOT APPLICABLE / FLAT DOSE          Initiated By: Camille Hansen LPN        Patient's questions were answered by staff at the time of their infusion/injection visit. Patient was not independently evaluated by the Nurse Practitioner on site at the [facility].

## 2024-10-25 ENCOUNTER — APPOINTMENT (OUTPATIENT)
Dept: INFUSION THERAPY | Facility: CLINIC | Age: 79
End: 2024-10-25
Payer: MEDICARE

## 2024-10-30 ENCOUNTER — LAB (OUTPATIENT)
Dept: LAB | Facility: LAB | Age: 79
End: 2024-10-30
Payer: MEDICARE

## 2024-10-30 DIAGNOSIS — D63.1 ANEMIA DUE TO STAGE 5 CHRONIC KIDNEY DISEASE, NOT ON CHRONIC DIALYSIS (MULTI): ICD-10-CM

## 2024-10-30 DIAGNOSIS — N18.5 ANEMIA DUE TO STAGE 5 CHRONIC KIDNEY DISEASE, NOT ON CHRONIC DIALYSIS (MULTI): ICD-10-CM

## 2024-10-30 LAB
ERYTHROCYTE [DISTWIDTH] IN BLOOD BY AUTOMATED COUNT: 15.4 % (ref 11.5–14.5)
HCT VFR BLD AUTO: 27 % (ref 36–46)
HGB BLD-MCNC: 8.5 G/DL (ref 12–16)
MCH RBC QN AUTO: 30.2 PG (ref 26–34)
MCHC RBC AUTO-ENTMCNC: 31.5 G/DL (ref 32–36)
MCV RBC AUTO: 96 FL (ref 80–100)
NRBC BLD-RTO: 0 /100 WBCS (ref 0–0)
PLATELET # BLD AUTO: 104 X10*3/UL (ref 150–450)
RBC # BLD AUTO: 2.81 X10*6/UL (ref 4–5.2)
WBC # BLD AUTO: 3.6 X10*3/UL (ref 4.4–11.3)

## 2024-10-30 PROCEDURE — 36415 COLL VENOUS BLD VENIPUNCTURE: CPT

## 2024-10-30 PROCEDURE — 85027 COMPLETE CBC AUTOMATED: CPT

## 2024-11-01 ENCOUNTER — APPOINTMENT (OUTPATIENT)
Dept: INFUSION THERAPY | Facility: CLINIC | Age: 79
End: 2024-11-01
Payer: MEDICARE

## 2024-11-08 ENCOUNTER — APPOINTMENT (OUTPATIENT)
Dept: INFUSION THERAPY | Facility: CLINIC | Age: 79
End: 2024-11-08
Payer: MEDICARE

## 2024-11-15 ENCOUNTER — APPOINTMENT (OUTPATIENT)
Dept: INFUSION THERAPY | Facility: CLINIC | Age: 79
End: 2024-11-15
Payer: MEDICARE

## 2024-11-15 VITALS
SYSTOLIC BLOOD PRESSURE: 158 MMHG | OXYGEN SATURATION: 99 % | RESPIRATION RATE: 16 BRPM | TEMPERATURE: 97 F | DIASTOLIC BLOOD PRESSURE: 68 MMHG | BODY MASS INDEX: 29.21 KG/M2 | HEART RATE: 71 BPM | WEIGHT: 159.72 LBS

## 2024-11-15 DIAGNOSIS — D64.9 ANEMIA, UNSPECIFIED TYPE: ICD-10-CM

## 2024-11-15 RX ORDER — DIPHENHYDRAMINE HYDROCHLORIDE 50 MG/ML
50 INJECTION INTRAMUSCULAR; INTRAVENOUS AS NEEDED
OUTPATIENT
Start: 2024-11-22

## 2024-11-15 RX ORDER — FAMOTIDINE 10 MG/ML
20 INJECTION INTRAVENOUS ONCE AS NEEDED
OUTPATIENT
Start: 2024-11-22

## 2024-11-15 RX ORDER — ALBUTEROL SULFATE 0.83 MG/ML
3 SOLUTION RESPIRATORY (INHALATION) AS NEEDED
OUTPATIENT
Start: 2024-11-22

## 2024-11-15 RX ORDER — EPINEPHRINE 0.3 MG/.3ML
0.3 INJECTION SUBCUTANEOUS EVERY 5 MIN PRN
OUTPATIENT
Start: 2024-11-22

## 2024-11-15 ASSESSMENT — ENCOUNTER SYMPTOMS
WOUND: 0
PALPITATIONS: 0
LIGHT-HEADEDNESS: 0
SHORTNESS OF BREATH: 0
EXTREMITY WEAKNESS: 0
COUGH: 0
WHEEZING: 0
DIZZINESS: 0
LEG SWELLING: 0
NUMBNESS: 0

## 2024-11-15 NOTE — PROGRESS NOTES
[facility]   Infusion Clinic Note   Date: November 15, 2024   Name: Vida May  : 1945   MRN: [id number]          Reason for Visit: Follow-up and Injections (Weekly Procrit 10,000 units subcutaneous/injection)         Today: We administered epoetin abhinav.         Visit Type: INJECTION       Ordered By: Tucker Pepper DO       Accompanied by: Daughter       Diagnosis: Anemia, unspecified type        Allergies:   Allergies as of 11/15/2024    (No Known Allergies)          Current Medications has a current medication list which includes the following prescription(s): allopurinol, amlodipine, aspirin, atorvastatin, clonidine, cyclobenzaprine, epoetin abhinav, ferrous sulfate (325 mg ferrous sulfate), furosemide, glipizide, hydralazine, lidocaine, losartan, metoprolol tartrate, omeprazole, prazosin, and sodium bicarbonate.       Vitals:   Vitals:    11/15/24 1625   BP: 158/68   Pulse: 71   Resp: 16   Temp: 36.1 °C (97 °F)   SpO2: 99%   Weight: 72.4 kg (159 lb 11.6 oz)                 Infusion Pre-procedure Checklist:   - Allergies reviewed: yes   - Medications reviewed: yes       - Previous reaction to current treatment: no      Assess patient for the concerns below. Document provider notification as appropriate.  - Active or recent infection with/without current antibiotic use: no  - Recent or planned invasive dental work: no  - Recent or planned surgeries: no  - Recently received or plans to receive vaccinations: no  - Has treatment related toxicities: no  - Is pregnant:  no      Pain: 0   - Is the pain different from normal: n/a   - Is your Doctor aware:  n/a                Fall Risk Screening: Janette Fall Risk  History of Falling, Immediate or Within 3 Months: No  Ambulatory Aid: Walks without aid/bedrest/nurse assist  Intravenous Therapy/Heparin Lock: No  Gait/Transferring: Normal/bedrest/immobile  Mental Status: Oriented to own ability       Review Of Systems:  Review of Systems    Respiratory:  Negative for cough, shortness of breath and wheezing.    Cardiovascular:  Negative for chest pain, leg swelling and palpitations.   Skin:  Negative for itching, rash and wound.   Neurological:  Negative for dizziness, extremity weakness, light-headedness and numbness.         ROS completed? Yes      Infusion Readiness:  - Assessment Concerns Related to Infusion: No  - Provider notified: n/a      Document Below Only If Indicated:   New Patient Education:    N/A (returning patient for continuation of therapy. Ongoing education provided as needed.)        Treatment Conditions & Drug Specific Questions:    Epoetin Celestino (EPOGEN. PROCRIT, RETACRIT)    TREATMENT CONDITIONS:    Unless otherwise specified on patient specific therapy plan HOLD and notify provider prior to proceeding with today's injection if:  o Hemoglobin GREATER than 11 (parameter set by prescribing provider)  o Increase in hemoglobin GREATER THAN 1 g/dL   o SBP GREATER THAN 160 mmHg    Lab Results   Component Value Date/Time    HGB 8.5 (L) 10/30/2024 1553    HGB 10.6 (L) 09/24/2024 1001    HGB 11.5 (L) 08/27/2024 1021    HGB 8.1 (A) 10/19/2023 1631     Labs reviewed and patient meets treatment conditions? Yes    DRUG SPECIFIC QUESTIONS:  - Does the patient have uncontrolled hypertension? Blood pressure elevated this visit.    (Use is contraindicated in patients with uncontrolled hypertension)     - Educate on the following:? Yes       - Increased risk of serious cardiovascular reactions, thromboembolic reactions, stroke and tumor progression.        - Need to undergo regular blood pressure monitoring. Adhere to prescribed anti-hypertensive regimen and follow recommended dietary restrictions.        - Need to contact their healthcare provider for new onset neurologic symptoms or change in seizure frequency.        - Need for  regular laboratory tests to monitor hemoglobin levels.          Weight Based Drug Calculations:    WEIGHT BASED  DRUGS: NOT APPLICABLE / FLAT DOSE          Initiated By: Marguerite Bobby LPN        Patient's questions were answered by staff at the time of their infusion/injection visit. Patient was not independently evaluated by the Nurse Practitioner on site at the [facility].

## 2024-11-15 NOTE — PATIENT INSTRUCTIONS
Today :We administered epoetin abhinav. Weekly Procrit 10,000 units subcutaneous  Injection right upper arm.   Hold Hgb >11  10- Hgb 8.5  Hgb every 4 weeks    For:   1. Anemia, unspecified type       Your next appointment is due in:  1 week        Please read the  Medication Guide that was given to you and reviewed during todays visit.     (Tell all doctors including dentists that you are taking this medication)     Go to the emergency room or call 911 if:  -You have signs of allergic reaction:   -Rash, hives, itching.   -Swollen, blistered, peeling skin.   -Swelling of face, lips, mouth, tongue or throat.   -Tightness of chest, trouble breathing, swallowing or talking     Call your doctor:  - If IV / injection site gets red, warm, swollen, itchy or leaks fluid or pus.     (Leave dressing on your IV site for at least 2 hours and keep area clean and dry  - If you get sick or have symptoms of infection or are not feeling well for any reason.    (Wash your hands often, stay away from people who are sick)  - If you have side effects from your medication that do not go away or are bothersome.     (Refer to the teaching your nurse gave you for side effects to call your doctor about)    - Common side effects may include:  stuffy nose, headache, feeling tired, muscle aches, upset stomach  - Before receiving any vaccines     - [facility] at   If:  - You get sick, are on antibiotics, have had a recent vaccine, have surgery or dental work and your doctor wants your visit rescheduled.  - You need to cancel and reschedule your visit for any reason. Call at least 2 days before your visit if you need to cancel.   - Your insurance changes before your next visit.    (We will need to get approval from your new insurance. This can take up to two weeks.)     [facility] is opened Monday thru Friday. We are closed on weekends and holidays.   Voice mail will take your call if the center  is closed. If you leave a message please allow 24 hours for a call back during weekdays. If you leave a message on a weekend/holiday, we will call you back the next business day.    A pharmacist is available Monday - Friday from 8:30AM to 3:30PM to help answer any questions you may have about your prescriptions(s). Please call pharmacy at:    [facility]: [phone]  [facility]: [phone]  [facility]: [phone]

## 2024-11-22 ENCOUNTER — APPOINTMENT (OUTPATIENT)
Dept: INFUSION THERAPY | Facility: CLINIC | Age: 79
End: 2024-11-22
Payer: MEDICARE

## 2024-11-29 ENCOUNTER — APPOINTMENT (OUTPATIENT)
Dept: INFUSION THERAPY | Facility: CLINIC | Age: 79
End: 2024-11-29
Payer: MEDICARE

## 2024-12-05 ENCOUNTER — LAB (OUTPATIENT)
Dept: LAB | Facility: LAB | Age: 79
End: 2024-12-05
Payer: MEDICARE

## 2024-12-05 ENCOUNTER — APPOINTMENT (OUTPATIENT)
Dept: INFUSION THERAPY | Facility: CLINIC | Age: 79
End: 2024-12-05
Payer: MEDICARE

## 2024-12-06 ENCOUNTER — APPOINTMENT (OUTPATIENT)
Dept: INFUSION THERAPY | Facility: CLINIC | Age: 79
End: 2024-12-06
Payer: MEDICARE

## 2024-12-13 ENCOUNTER — APPOINTMENT (OUTPATIENT)
Dept: INFUSION THERAPY | Facility: CLINIC | Age: 79
End: 2024-12-13
Payer: MEDICARE

## 2024-12-20 ENCOUNTER — APPOINTMENT (OUTPATIENT)
Dept: INFUSION THERAPY | Facility: CLINIC | Age: 79
End: 2024-12-20
Payer: MEDICARE

## 2025-01-02 ENCOUNTER — LAB (OUTPATIENT)
Dept: LAB | Facility: LAB | Age: 80
End: 2025-01-02
Payer: MEDICARE

## 2025-01-03 ENCOUNTER — APPOINTMENT (OUTPATIENT)
Dept: INFUSION THERAPY | Facility: CLINIC | Age: 80
End: 2025-01-03
Payer: MEDICARE

## 2025-01-03 VITALS
DIASTOLIC BLOOD PRESSURE: 70 MMHG | TEMPERATURE: 97 F | SYSTOLIC BLOOD PRESSURE: 181 MMHG | HEART RATE: 54 BPM | BODY MASS INDEX: 29.46 KG/M2 | RESPIRATION RATE: 18 BRPM | WEIGHT: 161.05 LBS | OXYGEN SATURATION: 96 %

## 2025-01-03 DIAGNOSIS — D64.9 ANEMIA, UNSPECIFIED TYPE: ICD-10-CM

## 2025-01-03 RX ORDER — INSULIN GLARGINE 100 [IU]/ML
10 INJECTION, SOLUTION SUBCUTANEOUS EVERY 24 HOURS
COMMUNITY

## 2025-01-03 ASSESSMENT — ENCOUNTER SYMPTOMS
WOUND: 0
SHORTNESS OF BREATH: 1
CONSTIPATION: 0
FEVER: 0
ARTHRALGIAS: 1
DEPRESSION: 0
SORE THROAT: 0
NAUSEA: 0
DECREASED CONCENTRATION: 0
NUMBNESS: 0
FATIGUE: 1
VOMITING: 0
TROUBLE SWALLOWING: 0
COUGH: 0
ADENOPATHY: 0
DIZZINESS: 0
PALPITATIONS: 0
DYSURIA: 0
CONFUSION: 0
HEADACHES: 0
APPETITE CHANGE: 0
MYALGIAS: 1
DIARRHEA: 0
EYE PROBLEMS: 0
LEG SWELLING: 1
NERVOUS/ANXIOUS: 0

## 2025-01-03 NOTE — PROGRESS NOTES
[facility]   Infusion Clinic Note   Date: January 3, 2025   Name: Vida May  : 1945   MRN: [id number]          Reason for Visit: Injections (PT. HERE FOR WEEKLY PROCRIT 10,000 UNIT SUBCUTANEOUS INJECTION.)         Today: Vida May had no medications administered during this visit.    PT. DID NOT GET PROCRIT INJECTION R/T SBP ABOVE 160.   Visit Type: INJECTION       Ordered By: DR. ELGIN LUEVANO       Accompanied by: DAUGHTER       Diagnosis: Anemia, unspecified type        Allergies:   Allergies as of 2025    (No Known Allergies)          Current Medications has a current medication list which includes the following prescription(s): allopurinol, amlodipine, aspirin, atorvastatin, clonidine, cyclobenzaprine, epoetin abhinav, ferrous sulfate (325 mg ferrous sulfate), furosemide, glipizide, hydralazine, lantus u-100 insulin, losartan, metoprolol tartrate, omeprazole, prazosin, sodium bicarbonate, and lidocaine.       Vitals:   Vitals:    25 1630 25 1643 25 1645   BP: (!) 207/73 (!) 192/72 (!) 185/71   Pulse: 60 57 57   Resp: 18     Temp: 36.1 °C (97 °F)     SpO2: 96%     Weight: 73.1 kg (161 lb 0.7 oz)               Infusion Pre-procedure Checklist:   - Allergies reviewed: yes   - Medications reviewed: yes       - Previous reaction to current treatment: no      Assess patient for the concerns below. Document provider notification as appropriate.  - Active or recent infection with/without current antibiotic use: no  - Recent or planned invasive dental work: no  - Recent or planned surgeries: no  - Recently received or plans to receive vaccinations: no  - Has treatment related toxicities: no  - Is pregnant:  n/a      Pain: 0   - Is the pain different from normal: n/a   - Is your Doctor aware:  n/a       Labs: N/A          Fall Risk Screening: Janette Fall Risk  History of Falling, Immediate or Within 3 Months: No  Secondary Diagnosis: No  Ambulatory Aid: Walks  "without aid/bedrest/nurse assist  Intravenous Therapy/Heparin Lock: No  Gait/Transferring: Normal/bedrest/immobile  Mental Status: Oriented to own ability  Savage Fall Risk Score: 0       Review Of Systems:  Review of Systems   Constitutional:  Positive for fatigue. Negative for appetite change and fever.        PT. DOES ADMITS TO BEING \"TIRED\".   HENT:   Negative for hearing loss, mouth sores, sore throat and trouble swallowing.    Eyes:  Negative for eye problems.   Respiratory:  Positive for shortness of breath. Negative for cough.         PT. ONLY COMPLAINT IS CANTU, FEELS IT IS MILD BUT GETTING WORSE.   Cardiovascular:  Positive for leg swelling. Negative for chest pain and palpitations.        BP ELEVATED ON ADMISSION, PT. ON MULTIPLE BP MEDS, STATED MEDS TAKEN TODAY.  ADMITS TO MILD ANKLE EDEMA.   Gastrointestinal:  Negative for constipation, diarrhea, nausea and vomiting.        HX OF DIABETES, STATES BLOOD SUGARS ARE GOOD.   Genitourinary:  Negative for dysuria.         HX OF CKD.   Musculoskeletal:  Positive for arthralgias and myalgias.        HAS INTERMITTENT GENERALIZED \"ACHINESS \" FROM ARTHRITIS.  HX OF GOUT.   Skin:  Negative for itching, rash and wound.   Neurological:  Negative for dizziness, headaches and numbness.   Hematological:  Negative for adenopathy.   Psychiatric/Behavioral:  Negative for confusion, decreased concentration and depression. The patient is not nervous/anxious.          ROS completed? Yes      Infusion Readiness:  - Assessment Concerns Related to Injection: Yes, BP ELEVATED ON ADMISSION.  - Provider notified: RAJEEV MELO NP INTO SEE PT. WILL CONTACT DR. LUEVANO.      Document Below Only If Indicated:   New Patient Education:    N/A (returning patient for continuation of therapy. Ongoing education provided as needed.)        Treatment Conditions & Drug Specific Questions:    Epoetin Celestino (EPOGEN. PROCRIT, RETACRIT)    TREATMENT CONDITIONS:    Unless otherwise specified on " "patient specific therapy plan HOLD and notify provider prior to proceeding with today's injection if:  o Hemoglobin GREATER THAN 11 g/dL (parameter set by prescribing provider)  o Increase in hemoglobin GREATER THAN 1 g/dL   o SBP GREATER THAN 160 mmHg    Lab Results   Component Value Date/Time    HGB 8.5 (L) 10/30/2024 1553    HGB 10.6 (L) 09/24/2024 1001    HGB 11.5 (L) 08/27/2024 1021    HGB 8.1 (A) 10/19/2023 1631     No results found for: \"HGBCAP\"     Labs reviewed and patient meets treatment conditions? POCT HGB TO BE DONE IF BP IN ACCEPTABLE RANGE.    DRUG SPECIFIC QUESTIONS:  - Does the patient have uncontrolled hypertension?  Yes    (Use is contraindicated in patients with uncontrolled hypertension)     - Educate on the following:? Yes       - Increased risk of serious cardiovascular reactions, thromboembolic reactions, stroke and tumor progression.        - Need to undergo regular blood pressure monitoring. Adhere to prescribed anti-hypertensive regimen and follow recommended dietary restrictions.        - Need to contact their healthcare provider for new onset neurologic symptoms or change in seizure frequency.        - Need for  regular laboratory tests to monitor hemoglobin levels.          Weight Based Drug Calculations:    WEIGHT BASED DRUGS: NOT APPLICABLE / FLAT DOSE          Initiated By: Ellyn Puentes RN        "

## 2025-01-03 NOTE — PATIENT INSTRUCTIONS
Today :Vida May had no medications administered during this visit.     For:   1. Anemia, unspecified type         Your next appointment is due in: 1 WEEK       Please read the  Medication Guide that was given to you and reviewed during todays visit.     (Tell all doctors including dentists that you are taking this medication)     Go to the emergency room or call 911 if:  -You have signs of allergic reaction:   -Rash, hives, itching.   -Swollen, blistered, peeling skin.   -Swelling of face, lips, mouth, tongue or throat.   -Tightness of chest, trouble breathing, swallowing or talking     Call your doctor:  - If IV / injection site gets red, warm, swollen, itchy or leaks fluid or pus.     (Leave dressing on your IV site for at least 2 hours and keep area clean and dry  - If you get sick or have symptoms of infection or are not feeling well for any reason.    (Wash your hands often, stay away from people who are sick)  - If you have side effects from your medication that do not go away or are bothersome.     (Refer to the teaching your nurse gave you for side effects to call your doctor about)    - Common side effects may include:  stuffy nose, headache, feeling tired, muscle aches, upset stomach  - Before receiving any vaccines     - Call [facility] at   If:  - You get sick, are on antibiotics, have had a recent vaccine, have surgery or dental work and your doctor wants your visit rescheduled.  - You need to cancel and reschedule your visit for any reason. Call at least 2 days before your visit if you need to cancel.   - Your insurance changes before your next visit.    (We will need to get approval from your new insurance. This can take up to two weeks.)     [facility] is opened Monday thru Friday. We are closed on weekends and holidays.   Voice mail will take your call if the center is closed. If you leave a message please allow 24 hours for a call back during  weekdays. If you leave a message on a weekend/holiday, we will call you back the next business day.    A pharmacist is available Monday - Friday from 8:30AM to 3:30PM to help answer any questions you may have about your prescriptions(s). Please call pharmacy at:    [facility]: [phone]  [facility]: [phone]  [facility]: [phone]

## 2025-01-10 ENCOUNTER — APPOINTMENT (OUTPATIENT)
Dept: INFUSION THERAPY | Facility: CLINIC | Age: 80
End: 2025-01-10
Payer: MEDICARE

## 2025-01-17 ENCOUNTER — APPOINTMENT (OUTPATIENT)
Dept: INFUSION THERAPY | Facility: CLINIC | Age: 80
End: 2025-01-17
Payer: MEDICARE

## 2025-01-24 ENCOUNTER — APPOINTMENT (OUTPATIENT)
Dept: INFUSION THERAPY | Facility: CLINIC | Age: 80
End: 2025-01-24
Payer: MEDICARE

## 2025-01-31 ENCOUNTER — APPOINTMENT (OUTPATIENT)
Dept: INFUSION THERAPY | Facility: CLINIC | Age: 80
End: 2025-01-31
Payer: MEDICARE

## 2025-02-06 DIAGNOSIS — N18.6 ESRD (END STAGE RENAL DISEASE) (MULTI): Primary | ICD-10-CM

## 2025-02-07 ENCOUNTER — APPOINTMENT (OUTPATIENT)
Dept: INFUSION THERAPY | Facility: CLINIC | Age: 80
End: 2025-02-07
Payer: MEDICARE

## 2025-02-14 ENCOUNTER — APPOINTMENT (OUTPATIENT)
Dept: INFUSION THERAPY | Facility: CLINIC | Age: 80
End: 2025-02-14
Payer: MEDICARE

## 2025-02-20 ENCOUNTER — APPOINTMENT (OUTPATIENT)
Dept: INFUSION THERAPY | Facility: CLINIC | Age: 80
End: 2025-02-20
Payer: MEDICARE

## 2025-02-28 ENCOUNTER — APPOINTMENT (OUTPATIENT)
Dept: INFUSION THERAPY | Facility: CLINIC | Age: 80
End: 2025-02-28
Payer: MEDICARE

## 2025-03-07 ENCOUNTER — APPOINTMENT (OUTPATIENT)
Dept: INFUSION THERAPY | Facility: CLINIC | Age: 80
End: 2025-03-07
Payer: MEDICARE

## 2025-03-14 ENCOUNTER — APPOINTMENT (OUTPATIENT)
Dept: INFUSION THERAPY | Facility: CLINIC | Age: 80
End: 2025-03-14
Payer: MEDICARE

## 2025-03-21 ENCOUNTER — APPOINTMENT (OUTPATIENT)
Dept: INFUSION THERAPY | Facility: CLINIC | Age: 80
End: 2025-03-21
Payer: MEDICARE

## 2025-04-28 ENCOUNTER — APPOINTMENT (OUTPATIENT)
Dept: VASCULAR SURGERY | Facility: CLINIC | Age: 80
End: 2025-04-28
Payer: MEDICARE